# Patient Record
Sex: FEMALE | Race: WHITE | NOT HISPANIC OR LATINO | URBAN - METROPOLITAN AREA
[De-identification: names, ages, dates, MRNs, and addresses within clinical notes are randomized per-mention and may not be internally consistent; named-entity substitution may affect disease eponyms.]

---

## 2023-06-27 ENCOUNTER — EMERGENCY (EMERGENCY)
Facility: HOSPITAL | Age: 18
LOS: 1 days | Discharge: HOME | End: 2023-06-27
Attending: EMERGENCY MEDICINE | Admitting: EMERGENCY MEDICINE
Payer: COMMERCIAL

## 2023-06-27 VITALS
DIASTOLIC BLOOD PRESSURE: 72 MMHG | SYSTOLIC BLOOD PRESSURE: 110 MMHG | RESPIRATION RATE: 18 BRPM | OXYGEN SATURATION: 100 % | TEMPERATURE: 100 F | HEART RATE: 125 BPM

## 2023-06-27 PROCEDURE — 93010 ELECTROCARDIOGRAM REPORT: CPT

## 2023-06-27 PROCEDURE — 99284 EMERGENCY DEPT VISIT MOD MDM: CPT

## 2023-06-27 NOTE — ED PROVIDER NOTE - PATIENT PORTAL LINK FT
You can access the FollowMyHealth Patient Portal offered by [facility] by registering at the following website: [email or web address]. By joining [employer]’s FollowMyHealth portal, you will also be able to view your health information using other applications (apps) compatible with our system.

## 2023-06-27 NOTE — ED ADULT TRIAGE NOTE - CHIEF COMPLAINT QUOTE
Pt c/o fever TMAX 104F at home with generalized bodyaches x1 day. Seen at urgent care today with negative rapid flu/covid. Denies cough, chest pain, palpitations. Took 1G Tylenol around 9PM with relief. Denies hx/daily meds. Pt c/o fever TMAX 104F at home with generalized bodyaches x1 day. Seen at urgent care today with negative rapid flu/covid. Denies cough, chest pain, palpitations. Took 1G Tylenol around 9PM with relief. Denies hx/daily meds. well appearing.

## 2023-06-27 NOTE — ED PROVIDER NOTE - CLINICAL SUMMARY MEDICAL DECISION MAKING FREE TEXT BOX
18F c/o 2-3d of fever, no focal sx but c/o assoc. HA and body aches, gen. weakness, "shakes." +HA worse w high fever PTA, improved at time of exam, doubt meningitis, NT abd to exam, will check UA but no CVAT or N/V. Cont oral liquids, discussed fever ctrl for next 1-2d and FU PMD for persistent fever past 5-7d.

## 2023-06-27 NOTE — ED PROVIDER NOTE - OBJECTIVE STATEMENT
18F c/o 2d hx of fever, today felt worse and noted temp was 104 so came to ED. No CP/SOB, no cough/phlegm, no N/V/D, no urinary c/o, no rashes. Pt. denies sick contacts or travel. In ED began to feel better, poss. due to fever medication taken PTA. Pt. c/o inc. HA w high fever. Denies neck pain, no neck stiffness, no weakness/numbness.

## 2023-06-28 LAB
APPEARANCE UR: CLEAR — SIGNIFICANT CHANGE UP
BACTERIA # UR AUTO: NEGATIVE — SIGNIFICANT CHANGE UP
BILIRUB UR-MCNC: NEGATIVE — SIGNIFICANT CHANGE UP
COLOR SPEC: COLORLESS — SIGNIFICANT CHANGE UP
DIFF PNL FLD: ABNORMAL
EPI CELLS # UR: 2 /HPF — SIGNIFICANT CHANGE UP (ref 0–5)
GLUCOSE UR QL: NEGATIVE — SIGNIFICANT CHANGE UP
HYALINE CASTS # UR AUTO: 0 /LPF — SIGNIFICANT CHANGE UP (ref 0–7)
KETONES UR-MCNC: ABNORMAL
LEUKOCYTE ESTERASE UR-ACNC: NEGATIVE — SIGNIFICANT CHANGE UP
NITRITE UR-MCNC: NEGATIVE — SIGNIFICANT CHANGE UP
PH UR: 6.5 — SIGNIFICANT CHANGE UP (ref 5–8)
PROT UR-MCNC: NEGATIVE — SIGNIFICANT CHANGE UP
RBC CASTS # UR COMP ASSIST: 3 /HPF — SIGNIFICANT CHANGE UP (ref 0–4)
SP GR SPEC: 1.01 — LOW (ref 1.01–1.05)
UROBILINOGEN FLD QL: SIGNIFICANT CHANGE UP
WBC UR QL: 1 /HPF — SIGNIFICANT CHANGE UP (ref 0–5)

## 2023-06-29 ENCOUNTER — INPATIENT (INPATIENT)
Facility: HOSPITAL | Age: 18
LOS: 3 days | Discharge: HOME | End: 2023-07-03
Attending: STUDENT IN AN ORGANIZED HEALTH CARE EDUCATION/TRAINING PROGRAM | Admitting: STUDENT IN AN ORGANIZED HEALTH CARE EDUCATION/TRAINING PROGRAM
Payer: COMMERCIAL

## 2023-06-29 VITALS
SYSTOLIC BLOOD PRESSURE: 105 MMHG | OXYGEN SATURATION: 100 % | TEMPERATURE: 99 F | DIASTOLIC BLOOD PRESSURE: 70 MMHG | RESPIRATION RATE: 98 BRPM | HEART RATE: 115 BPM

## 2023-06-29 LAB
CULTURE RESULTS: SIGNIFICANT CHANGE UP
SPECIMEN SOURCE: SIGNIFICANT CHANGE UP

## 2023-06-29 PROCEDURE — 99285 EMERGENCY DEPT VISIT HI MDM: CPT

## 2023-06-29 RX ORDER — ONDANSETRON 8 MG/1
4 TABLET, FILM COATED ORAL ONCE
Refills: 0 | Status: COMPLETED | OUTPATIENT
Start: 2023-06-29 | End: 2023-06-29

## 2023-06-29 RX ORDER — SODIUM CHLORIDE 9 MG/ML
1000 INJECTION, SOLUTION INTRAVENOUS ONCE
Refills: 0 | Status: COMPLETED | OUTPATIENT
Start: 2023-06-29 | End: 2023-06-29

## 2023-06-29 NOTE — ED PROVIDER NOTE - NS ED ATTENDING STATEMENT MOD
[facility] Intensive [facility]patient [facility]  Interdisciplinary Treatment Plan     Venkata Evangelista  MRN:[id number]   :1998     Diagnosis: Major depressive disorder, single episode, unspecified     Level of Care: IOP     Type of Therapy: Group     Amount: 3 hours per day     Frequency: 3 days per week     The patient is expected to attend 4 total weeks of group therapy unless otherwise indicated by the patient's needs/condition/progress.     The treatment plan will be reviewed in 2 weeks. (10/1/19)     Plan Type: Updated Plan     Your patient, Venkata Evangelista was seen in the Adult Mental Health Intensive Outpatient Program.  Please see below for the patient agreed upon plan, including Goal(s), Focus Indicators, Outcomes and Interventions.       Goals:  Patient Reported Goal #1: \"be happier and get through and enjoy each day.\"     Goal Type:  Pt Reported Goal #1 Type: Long Term Goal     Goal Progression:  Goal #1 Progression: Outcome Met - Continue evaluating goal progress toward completion     Objectives:  Patient will demonstrate reduction of overall anxiety and depressive symptoms, particularly worry and sad/tearful through the use of two or more new coping skills learned as well as verbalize feelings of improved mood/affect by 10/1/19.   Patient will describe alternative ways of dealing with negative feelings and emotional stress by 10/1/19.     Pt has shared of utilizing the different coping skills she's learned so far in programming to assist her with the management of her symptoms, despite reporting mostly fluctuating ratings on the intensity of her symptoms as displayed on her symptom rating scale form. Pt indicated about reaching out to her co-worker for support after she asserted herself when she spoke to her manager at work and it backfired. Pt described of how she focused on \"staying present\" and how helpful that was for her. Pt mentioned of adjustments she made, with emphasis on using  multiple alarms, to help her wake up in the morning which she referenced was a challenge for her.     Pt's ongoing stay in programming would be beneficial for her as she will be learning more coping skills in the upcoming group sessions to further improve her ability to manage symptoms effectively. Pt will work on constructive plans for after discharge.         Interventions:  Distress tolerance (Improve the moment, Pros and cons, TIP, STOP, Progressive muscle relaxation), Mindfulness (What skills, How skills, Mindfulness of current thoughts), Reality acceptance (Radical acceptance)        Jean Claude De Jesus, LPC    Attending with

## 2023-06-29 NOTE — ED ADULT TRIAGE NOTE - CHIEF COMPLAINT QUOTE
c/o n/v for the past 3 days, sent in by PMD  today for low WBC count and platelet count of 68. denies any medical hx.

## 2023-06-29 NOTE — ED PROVIDER NOTE - PHYSICAL EXAMINATION
Const: not in acute distress  Eyes: no conjunctival injection  HEENT: Head NCAT, Moist MM.  Neck: Trachea midline.   CVS: +S1/S2, Peripheral pulses 2+ and equal in all extremities.  RESP: Unlabored respiratory effort. Clear to auscultation bilaterally.  GI: Nontender/Nondistended, No CVA tenderness b/l.   MSK: Normocephalic/Atraumatic, No Lower Extremities edema b/l.   Skin: Intact.   Neuro: Motor & Sensation grossly intact.  Psych: Awake, Alert, & Cooperative

## 2023-06-29 NOTE — ED PROVIDER NOTE - OBJECTIVE STATEMENT
18 yo Female with PMhx underdeveloped GERD and scoliosis, p/w abnormal blood counts, specifically leukopenia and thrombocytopenia, send in by her primary care physician. On Tuesday, patient started to have a headache, fatigue and fever to 101. Patient spiked a fever to 104 later in the day, which prompted her to go to [facility] ED. She had urine collected, which was negative. Patient's fever has come down since then, but patient had some nausea/vomiting today and diarrhea. Nbnb emesis. Patient went to PMD today in preparation for international travel. Patient was noted to have a WBC of 2.1 and platelets of 68. Patient denies increased bleeding, increased bruising, rashes.

## 2023-06-29 NOTE — ED PROVIDER NOTE - CLINICAL SUMMARY MEDICAL DECISION MAKING FREE TEXT BOX
18 yo Female with PMhx underdeveloped GERD and scoliosis, p/w abnormal blood counts, specifically leukopenia and thrombocytopenia, send in by her primary care physician. Vitals tachy to 120s, BP soft at 90s/60s. Physical exam with heart RRR, lungs clear bilaterally, abdomen soft non tender. The differential diagnosis includes but is not limited to leukopenia 2/2 viral illness, ITP (less likely), thyroid dysfunction. H. pylori infection, lab error. Will get labs, coags, lipase, UA/UC, RVP, H. pylori, TSH. 18 yo Female with PMhx underdeveloped GERD and scoliosis, p/w abnormal blood counts, specifically leukopenia and thrombocytopenia, send in by her primary care physician. Vitals tachy to 120s, BP soft at 90s/60s. Physical exam with heart RRR, lungs clear bilaterally, abdomen soft non tender. The differential diagnosis includes but is not limited to leukopenia 2/2 viral illness, HIV, ITP (less likely), thyroid dysfunction. H. pylori infection, lab error. Will get labs, coags, lipase, UA/UC, RVP, H. pylori, TSH. 18 yo Female with PMhx underdeveloped GERD and scoliosis, p/w abnormal blood counts, specifically leukopenia and thrombocytopenia, send in by her primary care physician. Vitals tachy to 120s, BP soft at 90s/60s. Physical exam with heart RRR, lungs clear bilaterally, abdomen soft non tender.  The differential diagnosis includes but is not limited to leukopenia 2/2 viral illness, HIV, ITP (less likely), thyroid dysfunction. H. pylori infection, lab error. Will get labs, coags, lipase, UA/UC, RVP, H. pylori, TSH.

## 2023-06-30 DIAGNOSIS — D75.89 OTHER SPECIFIED DISEASES OF BLOOD AND BLOOD-FORMING ORGANS: ICD-10-CM

## 2023-06-30 DIAGNOSIS — R65.10 SYSTEMIC INFLAMMATORY RESPONSE SYNDROME (SIRS) OF NON-INFECTIOUS ORIGIN WITHOUT ACUTE ORGAN DYSFUNCTION: ICD-10-CM

## 2023-06-30 DIAGNOSIS — Z29.9 ENCOUNTER FOR PROPHYLACTIC MEASURES, UNSPECIFIED: ICD-10-CM

## 2023-06-30 DIAGNOSIS — E87.29 OTHER ACIDOSIS: ICD-10-CM

## 2023-06-30 DIAGNOSIS — R74.01 ELEVATION OF LEVELS OF LIVER TRANSAMINASE LEVELS: ICD-10-CM

## 2023-06-30 DIAGNOSIS — E03.8 OTHER SPECIFIED HYPOTHYROIDISM: ICD-10-CM

## 2023-06-30 DIAGNOSIS — D72.825 BANDEMIA: ICD-10-CM

## 2023-06-30 LAB
ALBUMIN SERPL ELPH-MCNC: 4.2 G/DL — SIGNIFICANT CHANGE UP (ref 3.3–5)
ALP SERPL-CCNC: 81 U/L — SIGNIFICANT CHANGE UP (ref 40–120)
ALT FLD-CCNC: 34 U/L — HIGH (ref 4–33)
ANION GAP SERPL CALC-SCNC: 15 MMOL/L — HIGH (ref 7–14)
APPEARANCE UR: CLEAR — SIGNIFICANT CHANGE UP
APTT BLD: 33.2 SEC — SIGNIFICANT CHANGE UP (ref 27–36.3)
AST SERPL-CCNC: 41 U/L — HIGH (ref 4–32)
B PERT DNA SPEC QL NAA+PROBE: SIGNIFICANT CHANGE UP
B PERT+PARAPERT DNA PNL SPEC NAA+PROBE: SIGNIFICANT CHANGE UP
BACTERIA # UR AUTO: NEGATIVE — SIGNIFICANT CHANGE UP
BASOPHILS # BLD AUTO: 0 K/UL — SIGNIFICANT CHANGE UP (ref 0–0.2)
BASOPHILS NFR BLD AUTO: 0 % — SIGNIFICANT CHANGE UP (ref 0–2)
BILIRUB SERPL-MCNC: 0.8 MG/DL — SIGNIFICANT CHANGE UP (ref 0.2–1.2)
BILIRUB UR-MCNC: NEGATIVE — SIGNIFICANT CHANGE UP
BORDETELLA PARAPERTUSSIS (RAPRVP): SIGNIFICANT CHANGE UP
BUN SERPL-MCNC: 7 MG/DL — SIGNIFICANT CHANGE UP (ref 7–23)
BURR CELLS BLD QL SMEAR: PRESENT — SIGNIFICANT CHANGE UP
BURR CELLS BLD QL SMEAR: SIGNIFICANT CHANGE UP
C PNEUM DNA SPEC QL NAA+PROBE: SIGNIFICANT CHANGE UP
CALCIUM SERPL-MCNC: 8.8 MG/DL — SIGNIFICANT CHANGE UP (ref 8.4–10.5)
CHLORIDE SERPL-SCNC: 104 MMOL/L — SIGNIFICANT CHANGE UP (ref 98–107)
CO2 SERPL-SCNC: 16 MMOL/L — LOW (ref 22–31)
COLOR SPEC: COLORLESS — SIGNIFICANT CHANGE UP
CREAT SERPL-MCNC: 0.56 MG/DL — SIGNIFICANT CHANGE UP (ref 0.5–1.3)
DIFF PNL FLD: ABNORMAL
EGFR: 135 ML/MIN/1.73M2 — SIGNIFICANT CHANGE UP
EOSINOPHIL # BLD AUTO: 0 K/UL — SIGNIFICANT CHANGE UP (ref 0–0.5)
EOSINOPHIL NFR BLD AUTO: 0 % — SIGNIFICANT CHANGE UP (ref 0–6)
EPI CELLS # UR: 1 /HPF — SIGNIFICANT CHANGE UP (ref 0–5)
FLUAV SUBTYP SPEC NAA+PROBE: SIGNIFICANT CHANGE UP
FLUBV RNA SPEC QL NAA+PROBE: SIGNIFICANT CHANGE UP
GIANT PLATELETS BLD QL SMEAR: PRESENT — SIGNIFICANT CHANGE UP
GLUCOSE SERPL-MCNC: 65 MG/DL — LOW (ref 70–99)
GLUCOSE UR QL: NEGATIVE — SIGNIFICANT CHANGE UP
HADV DNA SPEC QL NAA+PROBE: SIGNIFICANT CHANGE UP
HCG SERPL-ACNC: <1 MIU/ML — SIGNIFICANT CHANGE UP
HCOV 229E RNA SPEC QL NAA+PROBE: SIGNIFICANT CHANGE UP
HCOV HKU1 RNA SPEC QL NAA+PROBE: SIGNIFICANT CHANGE UP
HCOV NL63 RNA SPEC QL NAA+PROBE: SIGNIFICANT CHANGE UP
HCOV OC43 RNA SPEC QL NAA+PROBE: SIGNIFICANT CHANGE UP
HCT VFR BLD CALC: 39.4 % — SIGNIFICANT CHANGE UP (ref 34.5–45)
HGB BLD-MCNC: 13.5 G/DL — SIGNIFICANT CHANGE UP (ref 11.5–15.5)
HIV 1+2 AB+HIV1 P24 AG SERPL QL IA: SIGNIFICANT CHANGE UP
HMPV RNA SPEC QL NAA+PROBE: SIGNIFICANT CHANGE UP
HPIV1 RNA SPEC QL NAA+PROBE: SIGNIFICANT CHANGE UP
HPIV2 RNA SPEC QL NAA+PROBE: SIGNIFICANT CHANGE UP
HPIV3 RNA SPEC QL NAA+PROBE: SIGNIFICANT CHANGE UP
HPIV4 RNA SPEC QL NAA+PROBE: SIGNIFICANT CHANGE UP
IANC: 0.98 K/UL — LOW (ref 1.8–7.4)
INR BLD: 1.43 RATIO — HIGH (ref 0.88–1.16)
KETONES UR-MCNC: ABNORMAL
LEUKOCYTE ESTERASE UR-ACNC: NEGATIVE — SIGNIFICANT CHANGE UP
LIDOCAIN IGE QN: 26 U/L — SIGNIFICANT CHANGE UP (ref 7–60)
LYMPHOCYTES # BLD AUTO: 0.43 K/UL — LOW (ref 1–3.3)
LYMPHOCYTES # BLD AUTO: 25.4 % — SIGNIFICANT CHANGE UP (ref 13–44)
M PNEUMO DNA SPEC QL NAA+PROBE: SIGNIFICANT CHANGE UP
MAGNESIUM SERPL-MCNC: 2 MG/DL — SIGNIFICANT CHANGE UP (ref 1.6–2.6)
MANUAL SMEAR VERIFICATION: SIGNIFICANT CHANGE UP
MCHC RBC-ENTMCNC: 29.5 PG — SIGNIFICANT CHANGE UP (ref 27–34)
MCHC RBC-ENTMCNC: 34.3 GM/DL — SIGNIFICANT CHANGE UP (ref 32–36)
MCV RBC AUTO: 86 FL — SIGNIFICANT CHANGE UP (ref 80–100)
MONOCYTES # BLD AUTO: 0.09 K/UL — SIGNIFICANT CHANGE UP (ref 0–0.9)
MONOCYTES NFR BLD AUTO: 5.3 % — SIGNIFICANT CHANGE UP (ref 2–14)
NEUTROPHILS # BLD AUTO: 1.16 K/UL — LOW (ref 1.8–7.4)
NEUTROPHILS NFR BLD AUTO: 53.5 % — SIGNIFICANT CHANGE UP (ref 43–77)
NEUTS BAND # BLD: 14.9 % — CRITICAL HIGH (ref 0–6)
NITRITE UR-MCNC: NEGATIVE — SIGNIFICANT CHANGE UP
PH UR: 6.5 — SIGNIFICANT CHANGE UP (ref 5–8)
PLAT MORPH BLD: NORMAL — SIGNIFICANT CHANGE UP
PLATELET # BLD AUTO: 104 K/UL — LOW (ref 150–400)
PLATELET COUNT - ESTIMATE: ABNORMAL
POIKILOCYTOSIS BLD QL AUTO: SLIGHT — SIGNIFICANT CHANGE UP
POTASSIUM SERPL-MCNC: 4 MMOL/L — SIGNIFICANT CHANGE UP (ref 3.5–5.3)
POTASSIUM SERPL-SCNC: 4 MMOL/L — SIGNIFICANT CHANGE UP (ref 3.5–5.3)
PROT SERPL-MCNC: 7.1 G/DL — SIGNIFICANT CHANGE UP (ref 6–8.3)
PROT UR-MCNC: NEGATIVE — SIGNIFICANT CHANGE UP
PROTHROM AB SERPL-ACNC: 16.7 SEC — HIGH (ref 10.5–13.4)
RAPID RVP RESULT: SIGNIFICANT CHANGE UP
RBC # BLD: 4.58 M/UL — SIGNIFICANT CHANGE UP (ref 3.8–5.2)
RBC # FLD: 13.1 % — SIGNIFICANT CHANGE UP (ref 10.3–14.5)
RBC BLD AUTO: ABNORMAL
RBC CASTS # UR COMP ASSIST: 1 /HPF — SIGNIFICANT CHANGE UP (ref 0–4)
RSV RNA SPEC QL NAA+PROBE: SIGNIFICANT CHANGE UP
RV+EV RNA SPEC QL NAA+PROBE: SIGNIFICANT CHANGE UP
SARS-COV-2 RNA SPEC QL NAA+PROBE: SIGNIFICANT CHANGE UP
SMUDGE CELLS # BLD: PRESENT — SIGNIFICANT CHANGE UP
SODIUM SERPL-SCNC: 135 MMOL/L — SIGNIFICANT CHANGE UP (ref 135–145)
SP GR SPEC: 1.01 — LOW (ref 1.01–1.05)
T3 SERPL-MCNC: 102 NG/DL — SIGNIFICANT CHANGE UP (ref 80–200)
T4 AB SER-ACNC: 6.75 UG/DL — SIGNIFICANT CHANGE UP (ref 5.1–13)
TSH SERPL-MCNC: 15.07 UIU/ML — HIGH (ref 0.27–4.2)
UROBILINOGEN FLD QL: SIGNIFICANT CHANGE UP
VARIANT LYMPHS # BLD: 0.9 % — SIGNIFICANT CHANGE UP (ref 0–6)
WBC # BLD: 1.69 K/UL — LOW (ref 3.8–10.5)
WBC # FLD AUTO: 1.69 K/UL — LOW (ref 3.8–10.5)
WBC UR QL: 2 /HPF — SIGNIFICANT CHANGE UP (ref 0–5)

## 2023-06-30 PROCEDURE — 71046 X-RAY EXAM CHEST 2 VIEWS: CPT | Mod: 26

## 2023-06-30 PROCEDURE — 99223 1ST HOSP IP/OBS HIGH 75: CPT | Mod: GC

## 2023-06-30 RX ORDER — LANOLIN ALCOHOL/MO/W.PET/CERES
3 CREAM (GRAM) TOPICAL AT BEDTIME
Refills: 0 | Status: DISCONTINUED | OUTPATIENT
Start: 2023-06-30 | End: 2023-07-03

## 2023-06-30 RX ORDER — LEVALBUTEROL 1.25 MG/.5ML
0.63 SOLUTION, CONCENTRATE RESPIRATORY (INHALATION) ONCE
Refills: 0 | Status: COMPLETED | OUTPATIENT
Start: 2023-06-30 | End: 2023-06-30

## 2023-06-30 RX ORDER — CEFEPIME 1 G/1
2000 INJECTION, POWDER, FOR SOLUTION INTRAMUSCULAR; INTRAVENOUS ONCE
Refills: 0 | Status: COMPLETED | OUTPATIENT
Start: 2023-06-30 | End: 2023-06-30

## 2023-06-30 RX ORDER — CEFEPIME 1 G/1
INJECTION, POWDER, FOR SOLUTION INTRAMUSCULAR; INTRAVENOUS
Refills: 0 | Status: DISCONTINUED | OUTPATIENT
Start: 2023-06-30 | End: 2023-07-01

## 2023-06-30 RX ORDER — DIPHENHYDRAMINE HCL 50 MG
50 CAPSULE ORAL ONCE
Refills: 0 | Status: COMPLETED | OUTPATIENT
Start: 2023-06-30 | End: 2023-06-30

## 2023-06-30 RX ORDER — DIPHENHYDRAMINE HCL 50 MG
50 CAPSULE ORAL ONCE
Refills: 0 | Status: DISCONTINUED | OUTPATIENT
Start: 2023-06-30 | End: 2023-06-30

## 2023-06-30 RX ORDER — HYDROCORTISONE 20 MG
125 TABLET ORAL ONCE
Refills: 0 | Status: COMPLETED | OUTPATIENT
Start: 2023-06-30 | End: 2023-06-30

## 2023-06-30 RX ORDER — CEFEPIME 1 G/1
2000 INJECTION, POWDER, FOR SOLUTION INTRAMUSCULAR; INTRAVENOUS EVERY 8 HOURS
Refills: 0 | Status: DISCONTINUED | OUTPATIENT
Start: 2023-06-30 | End: 2023-07-01

## 2023-06-30 RX ORDER — ACETAMINOPHEN 500 MG
650 TABLET ORAL EVERY 6 HOURS
Refills: 0 | Status: DISCONTINUED | OUTPATIENT
Start: 2023-06-30 | End: 2023-07-03

## 2023-06-30 RX ORDER — VANCOMYCIN HCL 1 G
1000 VIAL (EA) INTRAVENOUS ONCE
Refills: 0 | Status: COMPLETED | OUTPATIENT
Start: 2023-06-30 | End: 2023-06-30

## 2023-06-30 RX ADMIN — Medication 50 MILLIGRAM(S): at 05:55

## 2023-06-30 RX ADMIN — CEFEPIME 100 MILLIGRAM(S): 1 INJECTION, POWDER, FOR SOLUTION INTRAMUSCULAR; INTRAVENOUS at 22:26

## 2023-06-30 RX ADMIN — Medication 650 MILLIGRAM(S): at 19:08

## 2023-06-30 RX ADMIN — Medication 250 MILLIGRAM(S): at 04:59

## 2023-06-30 RX ADMIN — ONDANSETRON 4 MILLIGRAM(S): 8 TABLET, FILM COATED ORAL at 00:01

## 2023-06-30 RX ADMIN — CEFEPIME 100 MILLIGRAM(S): 1 INJECTION, POWDER, FOR SOLUTION INTRAMUSCULAR; INTRAVENOUS at 04:28

## 2023-06-30 RX ADMIN — Medication 650 MILLIGRAM(S): at 20:49

## 2023-06-30 RX ADMIN — LEVALBUTEROL 0.63 MILLIGRAM(S): 1.25 SOLUTION, CONCENTRATE RESPIRATORY (INHALATION) at 06:28

## 2023-06-30 RX ADMIN — CEFEPIME 100 MILLIGRAM(S): 1 INJECTION, POWDER, FOR SOLUTION INTRAMUSCULAR; INTRAVENOUS at 15:35

## 2023-06-30 RX ADMIN — Medication 125 MILLIGRAM(S): at 05:53

## 2023-06-30 RX ADMIN — SODIUM CHLORIDE 1000 MILLILITER(S): 9 INJECTION, SOLUTION INTRAVENOUS at 00:01

## 2023-06-30 NOTE — H&P ADULT - PROBLEM SELECTOR PLAN 1
Patient p/w bicytopenia w/ mild-mod neutropenia (ANC=0.98) w/ reported fever at home but afebrile on presentation. [id number]  - Etiology likely iso viral marrow suppression: HIV neg, f/u Hepatitis and EBV. Unclear if patient   - Pt reportedly w/ fever at home but afebrile on presentation. Neutropenia w/ ANC 0.98 mild/moderate s/p cefepime x1. Given mild-mod neutropenia can monitor off abx. Low threshold to start cefepime  - Infectious w/u: f/u BCx; UCx  - ; not at risk of spontaneous bleed. c/t monitor  - Trend ANC, PLT, fever curve Patient p/w bicytopenia w/ mild-mod neutropenia (ANC=0.98) w/ reported fever at home but afebrile on presentation. ICB=580  - Etiology likely iso viral marrow suppression: HIV neg, f/u Hepatitis and EBV. Unclear if patient   - Pt reportedly w/ fever at home but afebrile on presentation. Neutropenia w/ ANC 0.98 mild/moderate s/p cefepime x1.   - consider cefepime  - Infectious w/u: f/u BCx; UCx  - ; not at risk of spontaneous bleed. c/t monitor  - Trend ANC, PLT, fever curve Patient p/w bicytopenia w/ mild-mod neutropenia (ANC=0.98) w/ reported fever at home but afebrile on presentation. [id number]  - Etiology likely iso viral marrow suppression: HIV neg, f/u Hepatitis and EBV.   - No splenomegaly or LUQ pain on exam  - Pt reportedly w/ fever at home but afebrile on presentation. Neutropenia w/ ANC 0.98 mild/moderate s/p cefepime x1.   - will start emperic cefepime coverage 2g q8h for now  - Infectious w/u: f/u BCx; UCx  - ; not at risk of spontaneous bleed. c/t monitor  - Trend ANC, PLT, fever curve

## 2023-06-30 NOTE — PATIENT PROFILE ADULT - FALL HARM RISK - HARM RISK INTERVENTIONS

## 2023-06-30 NOTE — ED ADULT NURSE REASSESSMENT NOTE - NS ED NURSE REASSESS COMMENT FT1
Vancomycin discontinued 20 mins after start of administration.  Vancomycin was administered over IV pump  Pt's grandmother informed this RN regarding Pt feeling discomfort.  Pt appeared visibly flushed, but denied difficulty breathing or swallowing  Current vital signs:  /69  Hr 110  O2 Sat 100% room air  Pt not in any visible distress at time of reassessment, breathing is even and unlabored   Admitting Provider notified regarding reaction. Currently at the bedside with Pt. Pending Provider orders

## 2023-06-30 NOTE — H&P ADULT - NSHPSOCIALHISTORY_GEN_ALL_CORE
Pt lives at home with mother. She is going to [employer] in [place]. She denies alcohol, drug, tobacco use.

## 2023-06-30 NOTE — H&P ADULT - NSHPREVIEWOFSYSTEMS_GEN_ALL_CORE
REVIEW OF SYSTEMS:    CONSTITUTIONAL: No weakness, fevers or chills  EYES/ENT: No visual changes;  No vertigo or throat pain   NECK: No pain or stiffness  RESPIRATORY: No cough, wheezing, hemoptysis; No shortness of breath  CARDIOVASCULAR: No chest pain or palpitations  GASTROINTESTINAL: No abdominal or epigastric pain. No nausea, vomiting, or hematemesis; No diarrhea or constipation. No melena or hematochezia.  GENITOURINARY: No dysuria, frequency or hematuria  NEUROLOGICAL: No numbness or weakness  SKIN: No itching, rashes REVIEW OF SYSTEMS:    CONSTITUTIONAL: +weakness, +fevers or chills  EYES/ENT: No visual changes;  No vertigo or throat pain   NECK: No pain or stiffness  RESPIRATORY: No cough, wheezing, hemoptysis; No shortness of breath  CARDIOVASCULAR: No chest pain or palpitations  GASTROINTESTINAL: No abdominal or epigastric pain. +nausea/vomiting. No hematemesis; +Loose BM. No diarrhea or constipation. No melena or hematochezia.  GENITOURINARY: No dysuria, frequency or hematuria  NEUROLOGICAL: No numbness or weakness  SKIN: No itching, rashes

## 2023-06-30 NOTE — H&P ADULT - ASSESSMENT
17F w/ PMH GERD, developmental disorder, scoliosis p/w bicytopenia (YBO=304; ANC=0.98) likely 2/2 viral marrow suppression.

## 2023-06-30 NOTE — H&P ADULT - NSHPPHYSICALEXAM_GEN_ALL_CORE
Vital Signs Last 24 Hrs  T(C): 37 (30 Jun 2023 05:29), Max: 37 (29 Jun 2023 19:30)  T(F): 98.6 (30 Jun 2023 05:29), Max: 98.6 (29 Jun 2023 19:30)  HR: 93 (30 Jun 2023 05:29) (93 - 124)  BP: 97/69 (30 Jun 2023 05:29) (96/67 - 105/70)  BP(mean): 76 (30 Jun 2023 05:29) (76 - 77)  RR: 16 (30 Jun 2023 05:29) (16 - 98)  SpO2: 100% (30 Jun 2023 05:29) (100% - 100%)    Parameters below as of 30 Jun 2023 05:29  Patient On (Oxygen Delivery Method): room air      PHYSICAL EXAM:  GENERAL: NAD, well-groomed, well-developed  HEAD:  Atraumatic, Normocephalic  EYES: EOMI, PERRLA, conjunctiva and sclera clear  ENMT: No tonsillar erythema, exudates, or enlargement; Moist mucous membranes  NECK: Supple, No JVD, Normal thyroid  HEART: Regular rate and rhythm; No murmurs, rubs, or gallops  RESPIRATORY: CTA B/L, No W/R/R  ABDOMEN: Soft, Nontender, Nondistended; Bowel sounds present  NEUROLOGY: A&Ox3, nonfocal, moving all extremities  EXTREMITIES:  2+ Peripheral Pulses, No clubbing, cyanosis, or edema  SKIN: warm, dry, normal color, no rash or abnormal lesions

## 2023-06-30 NOTE — H&P ADULT - PROBLEM SELECTOR PLAN 6
DVT ppx: IMPROVE=0. Pt is ambulatory. No pharmacologic ppx  Diet: Regular  Dispo: Pending clinical course to home

## 2023-06-30 NOTE — H&P ADULT - PROBLEM SELECTOR PLAN 5
DVT ppx: IMPROVE=0. Pt is ambulatory. No pharmacologic ppx  Diet: Regular  Dispo: Pending clinical course to home AGMA possibly iso ketosis d/t nausea/vomiting  - C/t monitor

## 2023-06-30 NOTE — CHART NOTE - NSCHARTNOTEFT_GEN_A_CORE
notified by RN patient reporting itching and tachycardic shortly after starting vancomycin. Patient assessed at bedside, NAD, reporting generalized itching, chest pressure, head pressure. Denies throat tightness difficulty breathing difficulty swallowing     Vital Signs Last 24 Hrs  T(C): 37 (30 Jun 2023 05:29), Max: 37 (29 Jun 2023 19:30)  T(F): 98.6 (30 Jun 2023 05:29), Max: 98.6 (29 Jun 2023 19:30)  HR: 93 (30 Jun 2023 05:29) (93 - 124)  BP: 97/69 (30 Jun 2023 05:29) (96/67 - 105/70)  BP(mean): 76 (30 Jun 2023 05:29) (76 - 77)  RR: 16 (30 Jun 2023 05:29) (16 - 98)  SpO2: 100% (30 Jun 2023 05:29) (100% - 100%)    Parameters below as of 30 Jun 2023 05:29  Patient On (Oxygen Delivery Method): room air    Physical Exam:  Gen: NAD, AOx4  Lungs: wheezing on ascultation. No rales or rhonchi  Skin: diffuse blotchy rash,   ENT: No tongue swelling erythema pharynx pink airway patent no stridor appreciated on exam.     -Pt given PO benedryl 50mg x1, IV solucortef 125mg, and xopenex   -CTM

## 2023-06-30 NOTE — H&P ADULT - ATTENDING COMMENTS
16 yo Female with PMHx unspecified developmental disorder, scoliosis, p/w low neutrophil count and bandemia. and thrombocytopenia. Patient w/ recent illness w/ nausea and vomiting possible viral gastroenteritis. Patient meeting SIRS criteria, so started on cefepime given neutropenia. Infectious workup so far un reveal CXR negative, RVP negative, Urine culture negative, HIV negative. Also w/ noted mild transaminitis. Ordered hepatitis panel and EBV for further workup. B12 and folate additionally ordered to assess for nutrient deficiency contributing to abnormal cbc. Could be post viral bone marrow suppression however leukemia within differential given bandemia. Will consider heme consult pending this workup and if counts do not improve.

## 2023-06-30 NOTE — H&P ADULT - TIME BILLING
Time spent on review of vital signs, labs, prior documentation. Patient interviewed and examined during this encounter. Plan of care was discussed with patient, patient mom and resident team. Encounter documented.

## 2023-06-30 NOTE — ED ADULT NURSE NOTE - OBJECTIVE STATEMENT
Pt received from triage on stretcher A/Ox4 answer all questions appropriately   C/C abnormal lab results from PCP, referred to our facility  Denies N-V-D, endorsing generalized weakness.  Pt denies chest pain and SOB during initial assessment, breathing is even and unlabored on room air  Abdomin is soft and non distended and non tender to touch  Pt ambulates independently with steady gait, moves all four extremities on command, skin is intact  Pt not in any apparent distress at time of initial assessment, resting comfortably at the bedside  Vital signs stable, Allergies noted to antibiotics    Denies PMHx  Pending provider orders Pt received from triage on stretcher A/Ox4 answer all questions appropriately   C/C abnormal lab results from PCP, referred to our facility  Denies N-V-D, endorsing generalized weakness.  Pt denies chest pain and SOB during initial assessment, breathing is even and unlabored on room air  Abdomin is soft and non distended and non tender to touch  Pt ambulates independently with steady gait, moves all four extremities on command, skin is intact  Pt not in any apparent distress at time of initial assessment, resting comfortably at the bedside  Vital signs stable, Allergies to Amoxicillin   Denies PMHx  Pending provider orders

## 2023-06-30 NOTE — ED ADULT NURSE REASSESSMENT NOTE - NS ED NURSE REASSESS COMMENT FT1
Break coverage RN: Pt A&Ox4 resting on stretcher. Respirations even and unlabored. Pt offers no complaints at this time. no acute distress noted. Pending lab results. Bed in lowest, safety maintained. Call bell within reach.

## 2023-06-30 NOTE — H&P ADULT - PROBLEM SELECTOR PLAN 2
Pt w/ elevated TSH w/ normal thyroxine and T3  - Will need to recheck TFT's in 4-6 weeks outpatient w/ PCP Pt meets SIRS criteria on presentation w/ tachycardia and leukopenia. Pt afebrile normotensive  - Etiology of infection likely viral  - Given neutropenia can consider cefepime  - F/u Infectious w/u: BCx, UCx Pt meets SIRS criteria on presentation w/ tachycardia and leukopenia. Pt afebrile normotensive  - Etiology of infection likely viral  - Given neutropenia will start cefepime  - F/u Infectious w/u: BCx, UCx

## 2023-06-30 NOTE — H&P ADULT - PROBLEM SELECTOR PLAN 3
Pt w/ mild transaminitis likely iso viral infection  - C/t monitor LFTs Pt w/ elevated TSH w/ normal thyroxine and T3  - Will need to recheck TFT's in 4-6 weeks outpatient w/ PCP

## 2023-06-30 NOTE — H&P ADULT - PROBLEM SELECTOR PLAN 4
AGMA possibly iso ketosis d/t nausea/vomiting  - C/t monitor Pt w/ mild transaminitis likely iso viral infection  - C/t monitor LFTs

## 2023-06-30 NOTE — H&P ADULT - HISTORY OF PRESENT ILLNESS
17F w/ PMhx developmental disorder, GERD, and scoliosis, p/w leukopenia and thrombocytopenia, send in by her primary care physician d/t abnormal outpatient labs. On Tuesday, patient started to have a headache, fatigue and fever reportedly up to 104F, which prompted her to go to [facility] ED. Pt's symptoms were a/w NBNB vomiting and diarrhea. Patient denies increased bleeding, increased bruising, rashes.    In the ED patient presented afebrile, tachycardic 's/60s, on Room air. Patient was found to have bicytopenia w/ ANC<1; . Started on cefepime and vancomycin. Pt w/ generalized rash and urticaria s/p vancomycin w/o s/s anaphylaxis.    17F w/ PMhx developmental disorder, GERD, and scoliosis, p/w leukopenia and thrombocytopenia, send in by her primary care physician d/t abnormal outpatient labs. On Tuesday, patient started to have a headache, fatigue and fever reportedly up to 104F, which prompted her to go to [facility] ED. Pt's symptoms were a/w NBNB vomiting and lose bowel movements. Patient reports that she was recently on a trip to [place]. She denies any sick contacts. Denies any recent viral prodrome to her symptoms. Per the patients mother, symptoms started rapidly and progressed over several days. Per parents patient has not had any hematologic disorders diagnosed by outpatient PCP. Pt has no family history of blood dyscrasias or malignancies. Patient denies increased bleeding, increased bruising, rashes.    In the ED patient presented afebrile, tachycardic 's/60s, on Room air. Patient was found to have bicytopenia w/ ANC<1; . Started on cefepime and vancomycin. Pt w/ generalized rash and urticaria s/p vancomycin w/o s/s anaphylaxis.

## 2023-06-30 NOTE — PATIENT PROFILE ADULT - FUNCTIONAL ASSESSMENT - DAILY ACTIVITY 5.
-Suspect symptoms 2/2 thoracic/lumbar paraspinal tenderness  -Provided prescription for naproxen 500mg BID x 10 days w/ meals  -Recommended frequent breaks at work and walking around  -Recommended chair cushion and heating pad for support  -Discussed back exercises, handout provided.   -CTM   4 = No assist / stand by assistance

## 2023-07-01 LAB
ALBUMIN SERPL ELPH-MCNC: 3.8 G/DL — SIGNIFICANT CHANGE UP (ref 3.3–5)
ALP SERPL-CCNC: 101 U/L — SIGNIFICANT CHANGE UP (ref 40–120)
ALT FLD-CCNC: 114 U/L — HIGH (ref 4–33)
ANION GAP SERPL CALC-SCNC: 13 MMOL/L — SIGNIFICANT CHANGE UP (ref 7–14)
AST SERPL-CCNC: 134 U/L — HIGH (ref 4–32)
BASOPHILS # BLD AUTO: 0.01 K/UL — SIGNIFICANT CHANGE UP (ref 0–0.2)
BASOPHILS NFR BLD AUTO: 0.3 % — SIGNIFICANT CHANGE UP (ref 0–2)
BILIRUB SERPL-MCNC: 0.6 MG/DL — SIGNIFICANT CHANGE UP (ref 0.2–1.2)
BUN SERPL-MCNC: 8 MG/DL — SIGNIFICANT CHANGE UP (ref 7–23)
CALCIUM SERPL-MCNC: 9 MG/DL — SIGNIFICANT CHANGE UP (ref 8.4–10.5)
CHLORIDE SERPL-SCNC: 103 MMOL/L — SIGNIFICANT CHANGE UP (ref 98–107)
CO2 SERPL-SCNC: 20 MMOL/L — LOW (ref 22–31)
CREAT SERPL-MCNC: 0.61 MG/DL — SIGNIFICANT CHANGE UP (ref 0.5–1.3)
CULTURE RESULTS: SIGNIFICANT CHANGE UP
EBV EA AB SER IA-ACNC: <5 U/ML — SIGNIFICANT CHANGE UP
EBV EA AB TITR SER IF: NEGATIVE — SIGNIFICANT CHANGE UP
EBV EA IGG SER-ACNC: NEGATIVE — SIGNIFICANT CHANGE UP
EBV NA IGG SER IA-ACNC: <3 U/ML — SIGNIFICANT CHANGE UP
EBV PATRN SPEC IB-IMP: SIGNIFICANT CHANGE UP
EBV VCA IGG AVIDITY SER QL IA: NEGATIVE — SIGNIFICANT CHANGE UP
EBV VCA IGM SER IA-ACNC: <10 U/ML — SIGNIFICANT CHANGE UP
EBV VCA IGM SER IA-ACNC: <10 U/ML — SIGNIFICANT CHANGE UP
EBV VCA IGM TITR FLD: NEGATIVE — SIGNIFICANT CHANGE UP
EGFR: 131 ML/MIN/1.73M2 — SIGNIFICANT CHANGE UP
EOSINOPHIL # BLD AUTO: 0 K/UL — SIGNIFICANT CHANGE UP (ref 0–0.5)
EOSINOPHIL NFR BLD AUTO: 0 % — SIGNIFICANT CHANGE UP (ref 0–6)
FOLATE SERPL-MCNC: 20 NG/ML — HIGH (ref 3.1–17.5)
GLUCOSE SERPL-MCNC: 87 MG/DL — SIGNIFICANT CHANGE UP (ref 70–99)
H PYLORI AB SER-ACNC: <5 UNITS — SIGNIFICANT CHANGE UP
HAV IGM SER-ACNC: SIGNIFICANT CHANGE UP
HBV CORE IGM SER-ACNC: SIGNIFICANT CHANGE UP
HBV SURFACE AG SER-ACNC: SIGNIFICANT CHANGE UP
HCT VFR BLD CALC: 35.7 % — SIGNIFICANT CHANGE UP (ref 34.5–45)
HCV AB S/CO SERPL IA: 0.11 S/CO — SIGNIFICANT CHANGE UP (ref 0–0.99)
HCV AB SERPL-IMP: SIGNIFICANT CHANGE UP
HGB BLD-MCNC: 12 G/DL — SIGNIFICANT CHANGE UP (ref 11.5–15.5)
IANC: 1.43 K/UL — LOW (ref 1.8–7.4)
IMM GRANULOCYTES NFR BLD AUTO: 0.3 % — SIGNIFICANT CHANGE UP (ref 0–0.9)
LYMPHOCYTES # BLD AUTO: 1.4 K/UL — SIGNIFICANT CHANGE UP (ref 1–3.3)
LYMPHOCYTES # BLD AUTO: 47.3 % — HIGH (ref 13–44)
MAGNESIUM SERPL-MCNC: 1.8 MG/DL — SIGNIFICANT CHANGE UP (ref 1.6–2.6)
MCHC RBC-ENTMCNC: 29.3 PG — SIGNIFICANT CHANGE UP (ref 27–34)
MCHC RBC-ENTMCNC: 33.6 GM/DL — SIGNIFICANT CHANGE UP (ref 32–36)
MCV RBC AUTO: 87.3 FL — SIGNIFICANT CHANGE UP (ref 80–100)
MONOCYTES # BLD AUTO: 0.11 K/UL — SIGNIFICANT CHANGE UP (ref 0–0.9)
MONOCYTES NFR BLD AUTO: 3.7 % — SIGNIFICANT CHANGE UP (ref 2–14)
NEUTROPHILS # BLD AUTO: 1.43 K/UL — LOW (ref 1.8–7.4)
NEUTROPHILS NFR BLD AUTO: 48.4 % — SIGNIFICANT CHANGE UP (ref 43–77)
NRBC # BLD: 0 /100 WBCS — SIGNIFICANT CHANGE UP (ref 0–0)
NRBC # FLD: 0 K/UL — SIGNIFICANT CHANGE UP (ref 0–0)
PHOSPHATE SERPL-MCNC: 2.6 MG/DL — SIGNIFICANT CHANGE UP (ref 2.5–4.5)
PLATELET # BLD AUTO: 122 K/UL — LOW (ref 150–400)
POTASSIUM SERPL-MCNC: 3.5 MMOL/L — SIGNIFICANT CHANGE UP (ref 3.5–5.3)
POTASSIUM SERPL-SCNC: 3.5 MMOL/L — SIGNIFICANT CHANGE UP (ref 3.5–5.3)
PROT SERPL-MCNC: 6.4 G/DL — SIGNIFICANT CHANGE UP (ref 6–8.3)
RBC # BLD: 4.09 M/UL — SIGNIFICANT CHANGE UP (ref 3.8–5.2)
RBC # FLD: 13.4 % — SIGNIFICANT CHANGE UP (ref 10.3–14.5)
SODIUM SERPL-SCNC: 136 MMOL/L — SIGNIFICANT CHANGE UP (ref 135–145)
SPECIMEN SOURCE: SIGNIFICANT CHANGE UP
VIT B12 SERPL-MCNC: 1159 PG/ML — HIGH (ref 200–900)
WBC # BLD: 2.96 K/UL — LOW (ref 3.8–10.5)
WBC # FLD AUTO: 2.96 K/UL — LOW (ref 3.8–10.5)

## 2023-07-01 PROCEDURE — 99233 SBSQ HOSP IP/OBS HIGH 50: CPT | Mod: GC

## 2023-07-01 RX ORDER — CEFEPIME 1 G/1
1000 INJECTION, POWDER, FOR SOLUTION INTRAMUSCULAR; INTRAVENOUS EVERY 8 HOURS
Refills: 0 | Status: DISCONTINUED | OUTPATIENT
Start: 2023-07-01 | End: 2023-07-02

## 2023-07-01 RX ORDER — MAGNESIUM SULFATE 500 MG/ML
1 VIAL (ML) INJECTION ONCE
Refills: 0 | Status: COMPLETED | OUTPATIENT
Start: 2023-07-01 | End: 2023-07-01

## 2023-07-01 RX ORDER — POTASSIUM CHLORIDE 20 MEQ
40 PACKET (EA) ORAL ONCE
Refills: 0 | Status: COMPLETED | OUTPATIENT
Start: 2023-07-01 | End: 2023-07-01

## 2023-07-01 RX ADMIN — CEFEPIME 100 MILLIGRAM(S): 1 INJECTION, POWDER, FOR SOLUTION INTRAMUSCULAR; INTRAVENOUS at 22:22

## 2023-07-01 RX ADMIN — CEFEPIME 100 MILLIGRAM(S): 1 INJECTION, POWDER, FOR SOLUTION INTRAMUSCULAR; INTRAVENOUS at 06:12

## 2023-07-01 RX ADMIN — Medication 40 MILLIEQUIVALENT(S): at 09:45

## 2023-07-01 RX ADMIN — CEFEPIME 100 MILLIGRAM(S): 1 INJECTION, POWDER, FOR SOLUTION INTRAMUSCULAR; INTRAVENOUS at 14:01

## 2023-07-01 RX ADMIN — Medication 100 GRAM(S): at 09:45

## 2023-07-01 NOTE — PROGRESS NOTE ADULT - ASSESSMENT
17F w/ PMH GERD, developmental disorder, scoliosis p/w bicytopenia (GPC=421; ANC=0.98) likely 2/2 viral marrow suppression.

## 2023-07-01 NOTE — PROGRESS NOTE ADULT - SUBJECTIVE AND OBJECTIVE BOX
Vishnu White, PGY1    SEBASTIANKEITHDAVID CHETNA  18y  Female    Complaints:  Subjective:     No acute o/n events. Pt denies subj fevers/chills, HA, dizziness, chest pain, palpitations, n/v, abd pain, dysuria, diarrhea      FAMILY HISTORY:    18yVital Signs Last 24 Hrs  T(C): 36.3 (01 Jul 2023 06:36), Max: 38 (30 Jun 2023 19:05)  T(F): 97.3 (01 Jul 2023 06:36), Max: 100.4 (30 Jun 2023 19:05)  HR: 99 (01 Jul 2023 06:36) (80 - 103)  BP: 96/67 (01 Jul 2023 06:36) (96/67 - 108/69)  BP(mean): --  RR: 18 (01 Jul 2023 06:36) (18 - 18)  SpO2: 100% (01 Jul 2023 06:36) (99% - 100%)    Parameters below as of 01 Jul 2023 06:36  Patient On (Oxygen Delivery Method): room air          PHYSICAL EXAM:  GENERAL: NAD, well-groomed, well-developed  HEAD:  Atraumatic, Normocephalic  EYES: EOMI, PERRLA, conjunctiva and sclera clear  ENMT: No tonsillar erythema, exudates, or enlargement; Moist mucous membranes, Good dentition, No lesions  NECK: Supple, No JVD, Normal thyroid  NERVOUS SYSTEM:  Alert & Oriented X3, Good concentration; Motor Strength 5/5 B/L upper and lower extremities; DTRs 2+ intact and symmetric  CHEST/LUNG: Clear to percussion bilaterally; No rales, rhonchi, wheezing, or rubs  HEART: Regular rate and rhythm; No murmurs, rubs, or gallops  ABDOMEN: Soft, Nontender, Nondistended; Bowel sounds present  EXTREMITIES:  2+ Peripheral Pulses, No clubbing, cyanosis, or edema  LYMPH: No lymphadenopathy noted  SKIN: No rashes or lesions      Consultant(s) Notes Reviewed:  [x ] YES  [ ] NO  Care Discussed with Consultants/Other Providers [ x] YES  [ ] NO    LABS:                Female  RADIOLOGY & ADDITIONAL TESTS:    Imaging Personally Reviewed:  [ ] YES  [ ] NO    MedsMEDICATIONS  (STANDING):  cefepime   IVPB      cefepime   IVPB 2000 milliGRAM(s) IV Intermittent every 8 hours    MEDICATIONS  (PRN):  acetaminophen     Tablet .. 650 milliGRAM(s) Oral every 6 hours PRN Temp greater or equal to 38C (100.4F), Mild Pain (1 - 3)  melatonin 3 milliGRAM(s) Oral at bedtime PRN Insomnia      HEALTH ISSUES - PROBLEM Dx:  Bicytopenia    SIRS (systemic inflammatory response syndrome)    Subclinical hypothyroidism    Transaminitis    Increased anion gap metabolic acidosis    Prophylactic measure                   Vishnu White, PGY1    SEBASTIANSABASGINA CHETNA  18y  Female    Complaints:  Subjective:     No acute o/n events. Pt denies subj fevers/chills, HA, dizziness, chest pain, palpitations, n/v, abd pain, dysuria, diarrhea      FAMILY HISTORY:    18yVital Signs Last 24 Hrs  T(C): 36.3 (01 Jul 2023 06:36), Max: 38 (30 Jun 2023 19:05)  T(F): 97.3 (01 Jul 2023 06:36), Max: 100.4 (30 Jun 2023 19:05)  HR: 99 (01 Jul 2023 06:36) (80 - 103)  BP: 96/67 (01 Jul 2023 06:36) (96/67 - 108/69)  BP(mean): --  RR: 18 (01 Jul 2023 06:36) (18 - 18)  SpO2: 100% (01 Jul 2023 06:36) (99% - 100%)    Parameters below as of 01 Jul 2023 06:36  Patient On (Oxygen Delivery Method): room air          PHYSICAL EXAM:  GENERAL: NAD, well-groomed, well-developed  HEAD:  Atraumatic, Normocephalic  EYES: EOMI, PERRLA, conjunctiva and sclera clear  ENMT: No tonsillar erythema, exudates, or enlargement; Moist mucous membranes, Good dentition, No lesions  NECK: Supple, No JVD, Normal thyroid  NERVOUS SYSTEM:  Alert & Oriented X3, Good concentration; Motor Strength 5/5 B/L upper and lower extremities; DTRs 2+ intact and symmetric  CHEST/LUNG: Clear to percussion bilaterally; No rales, rhonchi, wheezing, or rubs  HEART: Regular rate and rhythm; No murmurs, rubs, or gallops  ABDOMEN: Soft, Nontender, Nondistended; Bowel sounds present  EXTREMITIES:  2+ Peripheral Pulses, No clubbing, cyanosis, or edema  LYMPH: No lymphadenopathy noted  SKIN: No rashes or lesions      Consultant(s) Notes Reviewed:  [x ] YES  [ ] NO  Care Discussed with Consultants/Other Providers [ x] YES  [ ] NO    LABS:                          12.0   2.96  )-----------( 122      ( 01 Jul 2023 06:00 )             35.7       07-01    136  |  103  |  8   ----------------------------<  87  3.5   |  20<L>  |  0.61    Ca    9.0      01 Jul 2023 06:00  Phos  2.6     07-01  Mg     1.80     07-01    TPro  6.4  /  Alb  3.8  /  TBili  0.6  /  DBili  x   /  AST  134<H>  /  ALT  114<H>  /  AlkPhos  101  07-01              Urinalysis Basic - ( 01 Jul 2023 06:00 )    Color: x / Appearance: x / SG: x / pH: x  Gluc: 87 mg/dL / Ketone: x  / Bili: x / Urobili: x   Blood: x / Protein: x / Nitrite: x   Leuk Esterase: x / RBC: x / WBC x   Sq Epi: x / Non Sq Epi: x / Bacteria: x        PT/INR - ( 29 Jun 2023 23:40 )   PT: 16.7 sec;   INR: 1.43 ratio         PTT - ( 29 Jun 2023 23:40 )  PTT:33.2 sec          CAPILLARY BLOOD GLUCOSE        Female  RADIOLOGY & ADDITIONAL TESTS:    Imaging Personally Reviewed:  [ ] YES  [ ] NO    MedsMEDICATIONS  (STANDING):  cefepime   IVPB      cefepime   IVPB 2000 milliGRAM(s) IV Intermittent every 8 hours    MEDICATIONS  (PRN):  acetaminophen     Tablet .. 650 milliGRAM(s) Oral every 6 hours PRN Temp greater or equal to 38C (100.4F), Mild Pain (1 - 3)  melatonin 3 milliGRAM(s) Oral at bedtime PRN Insomnia      HEALTH ISSUES - PROBLEM Dx:  Bicytopenia    SIRS (systemic inflammatory response syndrome)    Subclinical hypothyroidism    Transaminitis    Increased anion gap metabolic acidosis    Prophylactic measure

## 2023-07-01 NOTE — DISCHARGE NOTE PROVIDER - NSDCCPCAREPLAN_GEN_ALL_CORE_FT
PRINCIPAL DISCHARGE DIAGNOSIS  Diagnosis: Bandemia  Assessment and Plan of Treatment: You came to the hospital with low white blood cell counts. Specifically, 2 types of white blood cells called the neutrophils and platelets were low. Neutrophils help protect your body against infection. Platelets help prevent bleeding. You came to the hospital w/ some GI symptoms including nausea/vomiting. We beleive you had a viral gastroenteritis. The viral illnes likely caused a temporary suppression of your bone marrow, the part of the body that produces blood cells. As your body recovered and cleared the infection your cell counts slowly improved. You were treated prophylactically with IV antibiotics. You did not have any confirmed bacterial infection, but with low levels of neutrophils, you are at risk of developing an infection. As your white blood cells and neutrophils increased in number, the antibiotics were discontinued as you no longer needed them. It is important that you follow up with your primary care doctor within 1 week to have repeat blood work to confirm continued improvement in your blood cell counts.      SECONDARY DISCHARGE DIAGNOSES  Diagnosis: Transaminitis  Assessment and Plan of Treatment: You had mild elevation in your liver enzymes. This is likely in reponse to the viral gastroenteritis and is a common and temporary reaction. You liver enzymes stabilized and you did not have any cocerning abdominal pain. You should follow up with you primary care doctor in 1 week to repeat blood work to ensure your labs are returning to your baseline.     PRINCIPAL DISCHARGE DIAGNOSIS  Diagnosis: Bandemia  Assessment and Plan of Treatment: You came to the hospital with low white blood cell counts. Specifically, 2 types of white blood cells called the neutrophils and platelets were low. Neutrophils help protect your body against infection. Platelets help prevent bleeding. You came to the hospital w/ some GI symptoms including nausea/vomiting. We beleive you had a viral gastroenteritis. The viral illnes likely caused a temporary suppression of your bone marrow, the part of the body that produces blood cells. As your body recovered and cleared the infection your cell counts slowly improved. It is unclear which viral illness you had. You were tested for several including EBV (Mono), HIV, Hepatitis, which were all negative. You were treated prophylactically with IV antibiotics. You did not have any confirmed bacterial infection, but with low levels of neutrophils, you are at risk of developing an infection. As your white blood cells and neutrophils increased in number, the antibiotics were discontinued as you no longer needed them. It is important that you follow up with your primary care doctor within 1 week to have repeat blood work to confirm continued improvement in your blood cell counts.      SECONDARY DISCHARGE DIAGNOSES  Diagnosis: Transaminitis  Assessment and Plan of Treatment: You had mild elevation in your liver enzymes. This is likely in reponse to the viral gastroenteritis and is a common and temporary reaction. You liver enzymes stabilized and you did not have any cocerning abdominal pain. You should follow up with you primary care doctor in 1 week to repeat blood work to ensure your labs are returning to your baseline.     PRINCIPAL DISCHARGE DIAGNOSIS  Diagnosis: Bandemia  Assessment and Plan of Treatment: You came to the hospital with low white blood cell counts. Specifically, 2 types of white blood cells called the neutrophils and platelets were low. Neutrophils help protect your body against infection. Platelets help prevent bleeding. You came to the hospital w/ some GI symptoms including nausea/vomiting. We beleive you had a viral gastroenteritis. The viral illnes likely caused a temporary suppression of your bone marrow, the part of the body that produces blood cells. As your body recovered and cleared the infection, your cell counts slowly improved. It is unclear which viral illness you had. You were tested for several including EBV (Mono), HIV, Hepatitis, which were all negative. You were treated prophylactically with IV antibiotics. You did not have any confirmed bacterial infection, but with low levels of neutrophils, you are at risk of developing an infection. As your white blood cells and neutrophils increased in number, the antibiotics were discontinued as you no longer needed them. It is important that you follow up with your primary care doctor within 1 week to have repeat blood work to confirm continued improvement in your blood cell counts.      SECONDARY DISCHARGE DIAGNOSES  Diagnosis: Transaminitis  Assessment and Plan of Treatment: You had mild elevation in your liver enzymes. This is likely in reponse to the antibiotic (cefepime) you received during admission. Your liver enzymes stabilized and you did not have any concerning abdominal pain. You should follow up with you primary care doctor in 1 week to repeat blood work to ensure your labs are returning to your baseline.     PRINCIPAL DISCHARGE DIAGNOSIS  Diagnosis: Bandemia  Assessment and Plan of Treatment: You came to the hospital with low white blood cell counts. Specifically, 2 types of white blood cells called the neutrophils and platelets were low. Neutrophils help protect your body against infection. Platelets help prevent bleeding. You came to the hospital w/ some GI symptoms including nausea/vomiting. We believe you had a viral gastroenteritis. The viral illness likely caused a temporary suppression of your bone marrow, the part of the body that produces blood cells. As your body recovered and cleared the infection, your cell counts slowly improved. It is unclear which viral illness you had. You were tested for several including EBV (Mono), HIV, Hepatitis, which were all negative. You were treated prophylactically with IV antibiotics. You did not have any confirmed bacterial infection, but with low levels of neutrophils, you are at risk of developing an infection. We reached out to the hematology team during your admission - they will follow-up with you *** As your white blood cells and neutrophils increased in number, the antibiotics were discontinued as you no longer needed them. It is important that you follow up with your primary care doctor within 1 week to have repeat blood work to confirm continued improvement in your blood cell counts.      SECONDARY DISCHARGE DIAGNOSES  Diagnosis: Transaminitis  Assessment and Plan of Treatment: You had mild elevation in your liver enzymes. This is likely in reponse to the antibiotic (cefepime) you received during admission. Your liver enzymes stabilized and you did not have any concerning abdominal pain. You should follow up with you primary care doctor in 1 week to repeat blood work to ensure your labs are returning to your baseline.     PRINCIPAL DISCHARGE DIAGNOSIS  Diagnosis: Bandemia  Assessment and Plan of Treatment: You came to the hospital with low white blood cell counts. Specifically, 2 types of white blood cells called the neutrophils and platelets were low. Neutrophils help protect your body against infection. Platelets help prevent bleeding. You came to the hospital w/ some GI symptoms including nausea/vomiting. We believe you had a viral gastroenteritis. The viral illness likely caused a temporary suppression of your bone marrow, the part of the body that produces blood cells. As your body recovered and cleared the infection, your cell counts slowly improved. It is unclear which viral illness you had. You were tested for several including EBV (Mono), HIV, Hepatitis, which were all negative. You were treated prophylactically with IV antibiotics. You did not have any confirmed bacterial infection, but with low levels of neutrophils, you are at risk of developing an infection. We reached out to the hematology team during your admission - you will need to follow up with your hematolagist outpatient. As your white blood cells and neutrophils increased in number, the antibiotics were discontinued as you no longer needed them. It is important that you follow up with your primary care doctor within 1 week to have repeat blood work to confirm continued improvement in your blood cell counts.      SECONDARY DISCHARGE DIAGNOSES  Diagnosis: Transaminitis  Assessment and Plan of Treatment: You had mild elevation in your liver enzymes. This is likely in reponse to the antibiotic (cefepime) you received during admission. Your liver enzymes stabilized and you did not have any concerning abdominal pain. You should follow up with you primary care doctor in 1 week to repeat blood work to ensure your labs are returning to your baseline.     PRINCIPAL DISCHARGE DIAGNOSIS  Diagnosis: Bandemia  Assessment and Plan of Treatment: You came to the hospital with low white blood cell counts. Specifically, 2 types of white blood cells called the neutrophils and platelets were low. Neutrophils help protect your body against infection. Platelets help prevent bleeding. You came to the hospital w/ some GI symptoms including nausea/vomiting. We believe you had a viral gastroenteritis. The viral illness likely caused a temporary suppression of your bone marrow, the part of the body that produces blood cells. As your body recovered and cleared the infection, your cell counts slowly improved. It is unclear which viral illness you had. You were tested for several including EBV (Mono), HIV, Hepatitis, which were all negative. You were treated prophylactically with IV antibiotics. You did not have any confirmed bacterial infection, but with low levels of neutrophils, you are at risk of developing an infection. We reached out to the hematology team during your admission - you will need to follow up with your hematolagist outpatient. As your white blood cells and neutrophils increased in number, the antibiotics were discontinued as you no longer needed them. It is important that you follow up with your primary care doctor within 1 week to have repeat blood work to confirm continued improvement in your blood cell counts. It is important that you follow up with your hematologist within 1-2 weeks.   If you start developing significant fevers/chills, nausea/vomiting, please seek urgent medical care      SECONDARY DISCHARGE DIAGNOSES  Diagnosis: Transaminitis  Assessment and Plan of Treatment: You had mild elevation in your liver enzymes. This is likely in reponse to the antibiotic (cefepime) you received during admission but can also be due to viral illness. Your liver enzymes stabilized and slightly downtrended and you did not have any concerning abdominal pain. An abdominal ultrasound was normal. You should follow up with you primary care doctor in 1 week to repeat blood work to ensure your labs are returning to your baseline.

## 2023-07-01 NOTE — DISCHARGE NOTE PROVIDER - HOSPITAL COURSE
18 yo Female with PMHx unspecified developmental disorder, scoliosis, p/w low neutrophil count and bandemia. and thrombocytopenia. Patient w/ recent illness w/ nausea and vomiting possible viral gastroenteritis. Patient meeting SIRS criteria, so started on cefepime given neutropenia. Infectious workup so far unrevealing CXR negative, RVP negative, Urine culture negative, HIV negative. Also w/ noted transaminitis. Ordered hepatitis panel and EBV for further workup. B12 and folate noted, no deficiency. UCx were negative. BCX Neg to date. Pt also w/ mild transaminitis likely iso viral gastroenteritis. Pt's labs monitored during hospital course and trended up. Patient's CBC and neutrophils trended upwards to ______ on discharge. Patient's mild elevations in AST/ALT remained stable and were _______ on discharge. Patient should have repeat outpatient labs within 1 week of discharge. On discharge patient is stable, afebrile.    18 yo Female with PMHx unspecified developmental disorder, scoliosis, p/w low neutrophil count and bandemia. and thrombocytopenia. Patient w/ recent illness w/ nausea and vomiting possible viral gastroenteritis. Patient meeting SIRS criteria, so started on cefepime given neutropenia. Infectious workup so far unrevealing CXR negative, RVP negative, Urine culture negative, HIV negative. Also w/ noted transaminitis. Ordered hepatitis panel and EBV for further workup. B12 and folate noted, no deficiency. EBV, HIV, Hepatitis, Hpylori were all negative. UCx were negative. BCX Neg to date. Pt also w/ mild transaminitis likely iso viral gastroenteritis. Pt's labs monitored during hospital course and trended up. Patient's CBC and neutrophils trended upwards to ______ on discharge. Patient's mild elevations in AST/ALT remained stable and were _______ on discharge. Patient should have repeat outpatient labs within 1 week of discharge. On discharge patient is stable, afebrile.    16 yo Female with PMHx unspecified developmental disorder, scoliosis, p/w low neutrophil count and bandemia, and thrombocytopenia. Patient w/ recent illness w/ nausea and vomiting possible viral gastroenteritis. Patient meeting SIRS criteria, so started on cefepime given neutropenia. Infectious workup so far unrevealing - CXR negative, RVP negative, urine culture negative, HIV negative. Also w/ noted transaminitis (peak AST/ALT - 351/313) etiology secondary to cefepime use while admitted vs infectious cause. Ordered hepatitis panel and EBV for further workup. B12 and folate noted, no deficiency. EBV, HIV, Hepatitis, H-pylori were all negative. UCx were negative. BCX no growth to date. IANC persistently low (0.81 on 7/3) since admission despite WBC and platelet trending up (WBC 3.69, Plt 146 at discharge). Hematology consulted on 7/3 to investigate cause of neutropenia, with plan to follow-up _______ inpatient/outpatient***. Patient should have repeat outpatient labs within 1 week of discharge. On discharge patient is stable, afebrile.    18 yo Female with PMHx unspecified developmental disorder, scoliosis, p/w low neutrophil count and bandemia, and thrombocytopenia. Patient w/ recent illness w/ nausea and vomiting possible viral gastroenteritis. Patient meeting SIRS criteria, so started on cefepime given neutropenia. Infectious workup so far unrevealing - CXR negative, RVP negative, urine culture negative, HIV negative. Also w/ noted transaminitis (peak AST/ALT - 351/313) etiology secondary to cefepime use while admitted vs infectious cause. Ordered hepatitis panel and EBV for further workup. B12 and folate noted, no deficiency. EBV, HIV, Hepatitis, H-pylori were all negative. UCx were negative. BCX no growth to date. IANC persistently low (0.81 on 7/3) since admission despite WBC and platelet trending up (WBC 3.69, Plt 146 at discharge). Hematology consulted on 7/3 to investigate cause of neutropenia, with plan to follow-up with hematology outpatient in 1-2 weeks. Patient to get repeat EBV test prior to d/c which heme will f/u on. Patient should have repeat outpatient labs within 1 week of discharge. On discharge patient is stable, afebrile.

## 2023-07-01 NOTE — DISCHARGE NOTE PROVIDER - NSDCCPTREATMENT_GEN_ALL_CORE_FT
PRINCIPAL PROCEDURE  Procedure: US abdomen RUQ  Findings and Treatment: INTERPRETATION:  CLINICAL INFORMATION: Elevated LFTs.  COMPARISON: None available.  TECHNIQUE: Sonography of the right upper quadrant.  FINDINGS:  Liver: Within normal limits.  Bile ducts: Normal caliber. Common bile duct measures 2 mm.  Gallbladder: Within normal limits.  Pancreas: Visualized portions are within normal limits.  Right kidney: 9.6 cm. No hydronephrosis.  Ascites: None.  IVC: Visualized portions are within normal limits.  IMPRESSION:  Normal right upper quadrant abdominal ultrasound.

## 2023-07-01 NOTE — PROGRESS NOTE ADULT - PROBLEM SELECTOR PLAN 1
Patient p/w bicytopenia w/ mild-mod neutropenia (ANC=0.98) w/ reported fever at home but afebrile on presentation. PLI=[id number]  - Etiology likely iso viral marrow suppression: HIV neg, f/u Hepatitis and EBV.   - No splenomegaly or LUQ pain on exam  - Pt reportedly w/ fever at home but afebrile on presentation. Neutropenia w/ ANC 0.98 mild/moderate s/p cefepime x1.   - will start emperic cefepime coverage 2g q8h for now  - Infectious w/u: f/u BCx; UCx  - ; not at risk of spontaneous bleed. c/t monitor  - Trend ANC, PLT, fever curve Patient p/w bicytopenia w/ mild-mod neutropenia (ANC=0.98) w/ reported fever at home but afebrile on presentation. TBC=351  - Etiology likely iso viral marrow suppression: HIV neg, f/u Hepatitis and EBV.   - No splenomegaly or LUQ pain on exam  - Pt febrile yesterday. Neutropenia w/ ANC 0.98 --> 1.4    - c/w emperic cefepime coverage 1g q8h for now  - Infectious w/u: f/u BCx; UCx negative  - PLT improving 122; not at risk of spontaneous bleed. c/t monitor  - Trend ANC, PLT, fever curve

## 2023-07-01 NOTE — DISCHARGE NOTE PROVIDER - NSFOLLOWUPCLINICS_GEN_ALL_ED_FT
[facility] - Primary Care  Primary Care  [address] [place] [address]  Phone: [phone]  Fax:   Follow Up Time: 1 week     [facility] - Primary Care  Primary Care  [address] [place] [address]  Phone: [phone]  Fax:   Follow Up Time: 1 week    [facility]  Hematology/Oncology  [address] [place] [id number]  Phone: [phone]  Fax:   Follow Up Time: 1 week

## 2023-07-01 NOTE — DISCHARGE NOTE PROVIDER - NSDCFUADDAPPT_GEN_ALL_CORE_FT
Please follow up with your primary care doctor within 1-2 weeks to monitor your cell counts and liver enzymes.    If you need a new primary care doctor, please make an appointment with General Internal Medicine, [address] [place] [address], [address], [place] [address]. Please call [phone] to make an appointment   1) Please follow up with your primary care doctor within 1-2 weeks to monitor your cell counts and liver enzymes.    2) Please follow up with hematology at [phone] within 1-2 weeks.     If you need a new primary care doctor, please make an appointment with General Internal Medicine, [address] [place] [address], [address], [place] [address]. Please call [phone] to make an appointment.

## 2023-07-01 NOTE — PROGRESS NOTE ADULT - PROBLEM SELECTOR PLAN 2
Pt meets SIRS criteria on presentation w/ tachycardia and leukopenia. Pt afebrile normotensive  - Etiology of infection likely viral  - Given neutropenia will start cefepime  - F/u Infectious w/u: BCx, UCx

## 2023-07-02 LAB
ALBUMIN SERPL ELPH-MCNC: 3.8 G/DL — SIGNIFICANT CHANGE UP (ref 3.3–5)
ALP SERPL-CCNC: 107 U/L — SIGNIFICANT CHANGE UP (ref 40–120)
ALT FLD-CCNC: 313 U/L — HIGH (ref 4–33)
ANION GAP SERPL CALC-SCNC: 6 MMOL/L — LOW (ref 7–14)
AST SERPL-CCNC: 351 U/L — HIGH (ref 4–32)
BASOPHILS # BLD AUTO: 0.02 K/UL — SIGNIFICANT CHANGE UP (ref 0–0.2)
BASOPHILS NFR BLD AUTO: 0.6 % — SIGNIFICANT CHANGE UP (ref 0–2)
BILIRUB SERPL-MCNC: 0.6 MG/DL — SIGNIFICANT CHANGE UP (ref 0.2–1.2)
BUN SERPL-MCNC: 7 MG/DL — SIGNIFICANT CHANGE UP (ref 7–23)
CALCIUM SERPL-MCNC: 8.8 MG/DL — SIGNIFICANT CHANGE UP (ref 8.4–10.5)
CHLORIDE SERPL-SCNC: 106 MMOL/L — SIGNIFICANT CHANGE UP (ref 98–107)
CO2 SERPL-SCNC: 21 MMOL/L — LOW (ref 22–31)
CREAT SERPL-MCNC: 0.5 MG/DL — SIGNIFICANT CHANGE UP (ref 0.5–1.3)
EGFR: 138 ML/MIN/1.73M2 — SIGNIFICANT CHANGE UP
EOSINOPHIL # BLD AUTO: 0.01 K/UL — SIGNIFICANT CHANGE UP (ref 0–0.5)
EOSINOPHIL NFR BLD AUTO: 0.3 % — SIGNIFICANT CHANGE UP (ref 0–6)
GLUCOSE SERPL-MCNC: 82 MG/DL — SIGNIFICANT CHANGE UP (ref 70–99)
HCT VFR BLD CALC: 34.1 % — LOW (ref 34.5–45)
HGB BLD-MCNC: 11.8 G/DL — SIGNIFICANT CHANGE UP (ref 11.5–15.5)
IANC: 0.85 K/UL — LOW (ref 1.8–7.4)
IMM GRANULOCYTES NFR BLD AUTO: 0.3 % — SIGNIFICANT CHANGE UP (ref 0–0.9)
LYMPHOCYTES # BLD AUTO: 2.14 K/UL — SIGNIFICANT CHANGE UP (ref 1–3.3)
LYMPHOCYTES # BLD AUTO: 66.3 % — HIGH (ref 13–44)
MAGNESIUM SERPL-MCNC: 1.8 MG/DL — SIGNIFICANT CHANGE UP (ref 1.6–2.6)
MCHC RBC-ENTMCNC: 29.7 PG — SIGNIFICANT CHANGE UP (ref 27–34)
MCHC RBC-ENTMCNC: 34.6 GM/DL — SIGNIFICANT CHANGE UP (ref 32–36)
MCV RBC AUTO: 85.9 FL — SIGNIFICANT CHANGE UP (ref 80–100)
MONOCYTES # BLD AUTO: 0.2 K/UL — SIGNIFICANT CHANGE UP (ref 0–0.9)
MONOCYTES NFR BLD AUTO: 6.2 % — SIGNIFICANT CHANGE UP (ref 2–14)
NEUTROPHILS # BLD AUTO: 0.85 K/UL — LOW (ref 1.8–7.4)
NEUTROPHILS NFR BLD AUTO: 26.3 % — LOW (ref 43–77)
NRBC # BLD: 0 /100 WBCS — SIGNIFICANT CHANGE UP (ref 0–0)
NRBC # FLD: 0 K/UL — SIGNIFICANT CHANGE UP (ref 0–0)
PHOSPHATE SERPL-MCNC: 2.8 MG/DL — SIGNIFICANT CHANGE UP (ref 2.5–4.5)
PLATELET # BLD AUTO: 122 K/UL — LOW (ref 150–400)
POTASSIUM SERPL-MCNC: 3.6 MMOL/L — SIGNIFICANT CHANGE UP (ref 3.5–5.3)
POTASSIUM SERPL-SCNC: 3.6 MMOL/L — SIGNIFICANT CHANGE UP (ref 3.5–5.3)
PROT SERPL-MCNC: 6.4 G/DL — SIGNIFICANT CHANGE UP (ref 6–8.3)
RBC # BLD: 3.97 M/UL — SIGNIFICANT CHANGE UP (ref 3.8–5.2)
RBC # FLD: 13.2 % — SIGNIFICANT CHANGE UP (ref 10.3–14.5)
SODIUM SERPL-SCNC: 133 MMOL/L — LOW (ref 135–145)
WBC # BLD: 3.23 K/UL — LOW (ref 3.8–10.5)
WBC # FLD AUTO: 3.23 K/UL — LOW (ref 3.8–10.5)

## 2023-07-02 PROCEDURE — 99233 SBSQ HOSP IP/OBS HIGH 50: CPT | Mod: GC

## 2023-07-02 PROCEDURE — 76705 ECHO EXAM OF ABDOMEN: CPT | Mod: 26

## 2023-07-02 RX ORDER — CEFEPIME 1 G/1
1000 INJECTION, POWDER, FOR SOLUTION INTRAMUSCULAR; INTRAVENOUS EVERY 8 HOURS
Refills: 0 | Status: DISCONTINUED | OUTPATIENT
Start: 2023-07-02 | End: 2023-07-02

## 2023-07-02 RX ADMIN — CEFEPIME 100 MILLIGRAM(S): 1 INJECTION, POWDER, FOR SOLUTION INTRAMUSCULAR; INTRAVENOUS at 06:32

## 2023-07-02 NOTE — PROGRESS NOTE ADULT - PROBLEM SELECTOR PLAN 1
Patient p/w bicytopenia w/ mild-mod neutropenia (ANC=0.98) w/ reported fever at home but afebrile on presentation. LZL=[id number]  - Etiology likely iso viral marrow suppression: HIV neg, f/u Hepatitis and EBV.   - No splenomegaly or LUQ pain on exam  - Pt febrile yesterday. Neutropenia w/ ANC 0.98 --> 1.4    - c/w emperic cefepime coverage 1g q8h for now  - Infectious w/u: f/u BCx; UCx negative  - PLT improving 122; not at risk of spontaneous bleed. c/t monitor  - Trend ANC, PLT, fever curve Patient p/w bicytopenia w/ mild-mod neutropenia (ANC=0.98) w/ reported fever at home but afebrile on presentation. HLX=782  - Etiology likely iso viral marrow suppression: HIV neg, f/u Hepatitis and EBV.   - No splenomegaly or LUQ pain on exam  - Pt febrile yesterday. Neutropenia w/ ANC 0.98 --> 1.4    - c/w emperic cefepime coverage 1g q8h for now  - Infectious w/u: f/u BCx; UCx negative  - PLT improving 122; not at risk of spontaneous bleed. c/t monitor  - Trend ANC, PLT, fever curve  - repeat ANC, will consider starting ertapenem if low ANC/downtrending persists.

## 2023-07-02 NOTE — PROGRESS NOTE ADULT - PROBLEM SELECTOR PLAN 4
Pt w/ mild transaminitis likely iso viral infection  - C/t monitor LFTs Pt w/ mild transaminitis likely iso viral infection  - C/t monitor LFTs  - RUQ US  - Likely DILI iso cefepime

## 2023-07-02 NOTE — PROGRESS NOTE ADULT - SUBJECTIVE AND OBJECTIVE BOX
PROGRESS NOTE:   Authored by Tommy Benson MD   Patient is a 18y old  Female who presents with a chief complaint of Neutropenia (01 Jul 2023 21:55)      SUBJECTIVE / OVERNIGHT EVENTS:  No acute events overnight.     ADDITIONAL REVIEW OF SYSTEMS:    MEDICATIONS  (STANDING):  cefepime   IVPB 1000 milliGRAM(s) IV Intermittent every 8 hours    MEDICATIONS  (PRN):  acetaminophen     Tablet .. 650 milliGRAM(s) Oral every 6 hours PRN Temp greater or equal to 38C (100.4F), Mild Pain (1 - 3)  melatonin 3 milliGRAM(s) Oral at bedtime PRN Insomnia      CAPILLARY BLOOD GLUCOSE        I&O's Summary      PHYSICAL EXAM:  Vital Signs Last 24 Hrs  T(C): 36.9 (02 Jul 2023 05:54), Max: 36.9 (02 Jul 2023 05:54)  T(F): 98.4 (02 Jul 2023 05:54), Max: 98.4 (02 Jul 2023 05:54)  HR: 79 (02 Jul 2023 05:54) (79 - 100)  BP: 102/73 (02 Jul 2023 05:54) (90/64 - 105/79)  BP(mean): --  RR: 18 (02 Jul 2023 05:54) (18 - 18)  SpO2: 98% (02 Jul 2023 05:54) (96% - 99%)    Parameters below as of 02 Jul 2023 05:54  Patient On (Oxygen Delivery Method): room air      GENERAL: NAD, well-groomed, well-developed  HEAD:  Atraumatic, Normocephalic  EYES: EOMI, PERRLA, conjunctiva and sclera clear  ENMT: No tonsillar erythema, exudates, or enlargement; Moist mucous membranes, Good dentition, No lesions  NECK: Supple, No JVD, Normal thyroid  NERVOUS SYSTEM:  Alert & Oriented X3, Good concentration; Motor Strength 5/5 B/L upper and lower extremities; DTRs 2+ intact and symmetric  CHEST/LUNG: Clear to percussion bilaterally; No rales, rhonchi, wheezing, or rubs  HEART: Regular rate and rhythm; No murmurs, rubs, or gallops  ABDOMEN: Soft, Nontender, Nondistended; Bowel sounds present  EXTREMITIES:  2+ Peripheral Pulses, No clubbing, cyanosis, or edema  LYMPH: No lymphadenopathy noted  SKIN: No rashes or lesions    LABS:                        12.0   2.96  )-----------( 122      ( 01 Jul 2023 06:00 )             35.7     07-01    136  |  103  |  8   ----------------------------<  87  3.5   |  20<L>  |  0.61    Ca    9.0      01 Jul 2023 06:00  Phos  2.6     07-01  Mg     1.80     07-01    TPro  6.4  /  Alb  3.8  /  TBili  0.6  /  DBili  x   /  AST  134<H>  /  ALT  114<H>  /  AlkPhos  101  07-01          Urinalysis Basic - ( 01 Jul 2023 06:00 )    Color: x / Appearance: x / SG: x / pH: x  Gluc: 87 mg/dL / Ketone: x  / Bili: x / Urobili: x   Blood: x / Protein: x / Nitrite: x   Leuk Esterase: x / RBC: x / WBC x   Sq Epi: x / Non Sq Epi: x / Bacteria: x        Culture - Blood (collected 30 Jun 2023 02:43)  Source: .Blood Blood-Peripheral  Preliminary Report (01 Jul 2023 12:01):    No growth at 24 hours    Culture - Blood (collected 30 Jun 2023 02:43)  Source: .Blood Blood-Peripheral  Preliminary Report (01 Jul 2023 12:01):    No growth at 24 hours    Culture - Urine (collected 30 Jun 2023 01:41)  Source: Clean Catch Clean Catch (Midstream)  Final Report (01 Jul 2023 08:29):    <10,000 CFU/mL Normal Urogenital Crista        RADIOLOGY & ADDITIONAL TESTS:  Lab Results Reviewed   Imaging Reviewed  Electrocardiogram Reviewed   PROGRESS NOTE:   Authored by Tommy Benson MD   Patient is a 18y old  Female who presents with a chief complaint of Neutropenia (01 Jul 2023 21:55)      SUBJECTIVE / OVERNIGHT EVENTS:  No acute events overnight.     ANC downtrending, AST/ALT uptrending this AM.    No complaints from patient.    ADDITIONAL REVIEW OF SYSTEMS:    MEDICATIONS  (STANDING):  cefepime   IVPB 1000 milliGRAM(s) IV Intermittent every 8 hours    MEDICATIONS  (PRN):  acetaminophen     Tablet .. 650 milliGRAM(s) Oral every 6 hours PRN Temp greater or equal to 38C (100.4F), Mild Pain (1 - 3)  melatonin 3 milliGRAM(s) Oral at bedtime PRN Insomnia      CAPILLARY BLOOD GLUCOSE        I&O's Summary      PHYSICAL EXAM:  Vital Signs Last 24 Hrs  T(C): 36.9 (02 Jul 2023 05:54), Max: 36.9 (02 Jul 2023 05:54)  T(F): 98.4 (02 Jul 2023 05:54), Max: 98.4 (02 Jul 2023 05:54)  HR: 79 (02 Jul 2023 05:54) (79 - 100)  BP: 102/73 (02 Jul 2023 05:54) (90/64 - 105/79)  BP(mean): --  RR: 18 (02 Jul 2023 05:54) (18 - 18)  SpO2: 98% (02 Jul 2023 05:54) (96% - 99%)    Parameters below as of 02 Jul 2023 05:54  Patient On (Oxygen Delivery Method): room air      GENERAL: NAD, well-groomed, well-developed  HEAD:  Atraumatic, Normocephalic  EYES: EOMI, PERRLA, conjunctiva and sclera clear  ENMT: No tonsillar erythema, exudates, or enlargement; Moist mucous membranes, Good dentition, No lesions  NECK: Supple, No JVD, Normal thyroid  NERVOUS SYSTEM:  Alert & Oriented X3, Good concentration; Motor Strength 5/5 B/L upper and lower extremities; DTRs 2+ intact and symmetric  CHEST/LUNG: Clear to percussion bilaterally; No rales, rhonchi, wheezing, or rubs  HEART: Regular rate and rhythm; No murmurs, rubs, or gallops  ABDOMEN: Soft, Nontender, Nondistended; Bowel sounds present  EXTREMITIES:  2+ Peripheral Pulses, No clubbing, cyanosis, or edema  LYMPH: No lymphadenopathy noted  SKIN: No rashes or lesions    LABS:                        12.0   2.96  )-----------( 122      ( 01 Jul 2023 06:00 )             35.7     07-01    136  |  103  |  8   ----------------------------<  87  3.5   |  20<L>  |  0.61    Ca    9.0      01 Jul 2023 06:00  Phos  2.6     07-01  Mg     1.80     07-01    TPro  6.4  /  Alb  3.8  /  TBili  0.6  /  DBili  x   /  AST  134<H>  /  ALT  114<H>  /  AlkPhos  101  07-01          Urinalysis Basic - ( 01 Jul 2023 06:00 )    Color: x / Appearance: x / SG: x / pH: x  Gluc: 87 mg/dL / Ketone: x  / Bili: x / Urobili: x   Blood: x / Protein: x / Nitrite: x   Leuk Esterase: x / RBC: x / WBC x   Sq Epi: x / Non Sq Epi: x / Bacteria: x        Culture - Blood (collected 30 Jun 2023 02:43)  Source: .Blood Blood-Peripheral  Preliminary Report (01 Jul 2023 12:01):    No growth at 24 hours    Culture - Blood (collected 30 Jun 2023 02:43)  Source: .Blood Blood-Peripheral  Preliminary Report (01 Jul 2023 12:01):    No growth at 24 hours    Culture - Urine (collected 30 Jun 2023 01:41)  Source: Clean Catch Clean Catch (Midstream)  Final Report (01 Jul 2023 08:29):    <10,000 CFU/mL Normal Urogenital Crista        RADIOLOGY & ADDITIONAL TESTS:  Lab Results Reviewed   Imaging Reviewed  Electrocardiogram Reviewed

## 2023-07-02 NOTE — PROGRESS NOTE ADULT - ASSESSMENT
17F w/ PMH GERD, developmental disorder, scoliosis p/w bicytopenia (IIR=260; ANC=0.98) likely 2/2 viral marrow suppression.

## 2023-07-02 NOTE — PROVIDER CONTACT NOTE (OTHER) - SITUATION
patient refused cbc labs this afternoon, plan is that she is leaving tomorrow since she is not receiving any IV abx.
Patient temp 100.4F, tylenol PRN given, will reassess
Pt c/o redness to B/L cheeks and anterior thighs

## 2023-07-02 NOTE — PROVIDER CONTACT NOTE (OTHER) - ASSESSMENT
Pt c/o redness to B/L cheeks and anterior thighs
V/S as per flowsheet
patient asymptomatic, just refused the afternoon cbc lab

## 2023-07-02 NOTE — PROVIDER CONTACT NOTE (OTHER) - ACTION/TREATMENT ORDERED:
Provider aware, will await further orders
MD evaluated pt and said to monitor for now.
MD notified and aware.

## 2023-07-02 NOTE — PROGRESS NOTE ADULT - TIME BILLING
Time spent on review of vital signs, labs, prior documentation. Patient interviewed and examined during this encounter. Plan of care was discussed with patient, patient mom and resident team. Encounter documented.
Time spent includes direct patient care  (interview and examination of patient), discussion with other providers, support staff and/or patient's family members, review of medical records, ordering diagnostic tests and analyzing results, and documentation.

## 2023-07-03 VITALS
OXYGEN SATURATION: 100 % | TEMPERATURE: 98 F | RESPIRATION RATE: 18 BRPM | SYSTOLIC BLOOD PRESSURE: 98 MMHG | HEART RATE: 79 BPM | DIASTOLIC BLOOD PRESSURE: 70 MMHG

## 2023-07-03 LAB
ALBUMIN SERPL ELPH-MCNC: 4 G/DL — SIGNIFICANT CHANGE UP (ref 3.3–5)
ALP SERPL-CCNC: 111 U/L — SIGNIFICANT CHANGE UP (ref 40–120)
ALT FLD-CCNC: 290 U/L — HIGH (ref 4–33)
ANION GAP SERPL CALC-SCNC: 14 MMOL/L — SIGNIFICANT CHANGE UP (ref 7–14)
AST SERPL-CCNC: 212 U/L — HIGH (ref 4–32)
BASOPHILS # BLD AUTO: 0.04 K/UL — SIGNIFICANT CHANGE UP (ref 0–0.2)
BASOPHILS # BLD AUTO: 0.04 K/UL — SIGNIFICANT CHANGE UP (ref 0–0.2)
BASOPHILS NFR BLD AUTO: 1.1 % — SIGNIFICANT CHANGE UP (ref 0–2)
BASOPHILS NFR BLD AUTO: 1.1 % — SIGNIFICANT CHANGE UP (ref 0–2)
BILIRUB SERPL-MCNC: 0.4 MG/DL — SIGNIFICANT CHANGE UP (ref 0.2–1.2)
BUN SERPL-MCNC: 6 MG/DL — LOW (ref 7–23)
CALCIUM SERPL-MCNC: 9.1 MG/DL — SIGNIFICANT CHANGE UP (ref 8.4–10.5)
CHLORIDE SERPL-SCNC: 101 MMOL/L — SIGNIFICANT CHANGE UP (ref 98–107)
CO2 SERPL-SCNC: 21 MMOL/L — LOW (ref 22–31)
CREAT SERPL-MCNC: 0.5 MG/DL — SIGNIFICANT CHANGE UP (ref 0.5–1.3)
EGFR: 138 ML/MIN/1.73M2 — SIGNIFICANT CHANGE UP
EOSINOPHIL # BLD AUTO: 0.03 K/UL — SIGNIFICANT CHANGE UP (ref 0–0.5)
EOSINOPHIL # BLD AUTO: 0.03 K/UL — SIGNIFICANT CHANGE UP (ref 0–0.5)
EOSINOPHIL NFR BLD AUTO: 0.8 % — SIGNIFICANT CHANGE UP (ref 0–6)
EOSINOPHIL NFR BLD AUTO: 0.8 % — SIGNIFICANT CHANGE UP (ref 0–6)
GLUCOSE SERPL-MCNC: 86 MG/DL — SIGNIFICANT CHANGE UP (ref 70–99)
HCT VFR BLD CALC: 37.2 % — SIGNIFICANT CHANGE UP (ref 34.5–45)
HGB BLD-MCNC: 12.4 G/DL — SIGNIFICANT CHANGE UP (ref 11.5–15.5)
IANC: 0.81 K/UL — LOW (ref 1.8–7.4)
IMM GRANULOCYTES NFR BLD AUTO: 0.3 % — SIGNIFICANT CHANGE UP (ref 0–0.9)
LYMPHOCYTES # BLD AUTO: 2.6 K/UL — SIGNIFICANT CHANGE UP (ref 1–3.3)
LYMPHOCYTES # BLD AUTO: 2.6 K/UL — SIGNIFICANT CHANGE UP (ref 1–3.3)
LYMPHOCYTES # BLD AUTO: 70.5 % — HIGH (ref 13–44)
LYMPHOCYTES # BLD AUTO: 70.5 % — HIGH (ref 13–44)
MAGNESIUM SERPL-MCNC: 2 MG/DL — SIGNIFICANT CHANGE UP (ref 1.6–2.6)
MCHC RBC-ENTMCNC: 29.5 PG — SIGNIFICANT CHANGE UP (ref 27–34)
MCHC RBC-ENTMCNC: 33.3 GM/DL — SIGNIFICANT CHANGE UP (ref 32–36)
MCV RBC AUTO: 88.4 FL — SIGNIFICANT CHANGE UP (ref 80–100)
MONOCYTES # BLD AUTO: 0.2 K/UL — SIGNIFICANT CHANGE UP (ref 0–0.9)
MONOCYTES # BLD AUTO: 0.2 K/UL — SIGNIFICANT CHANGE UP (ref 0–0.9)
MONOCYTES NFR BLD AUTO: 5.4 % — SIGNIFICANT CHANGE UP (ref 2–14)
MONOCYTES NFR BLD AUTO: 5.4 % — SIGNIFICANT CHANGE UP (ref 2–14)
NEUTROPHILS # BLD AUTO: 0.81 K/UL — LOW (ref 1.8–7.4)
NEUTROPHILS # BLD AUTO: 0.81 K/UL — LOW (ref 1.8–7.4)
NEUTROPHILS NFR BLD AUTO: 21.9 % — LOW (ref 43–77)
NEUTROPHILS NFR BLD AUTO: 21.9 % — LOW (ref 43–77)
NRBC # BLD: 0 /100 WBCS — SIGNIFICANT CHANGE UP (ref 0–0)
NRBC # FLD: 0 K/UL — SIGNIFICANT CHANGE UP (ref 0–0)
PHOSPHATE SERPL-MCNC: 3.4 MG/DL — SIGNIFICANT CHANGE UP (ref 2.5–4.5)
PLAT MORPH BLD: NORMAL — SIGNIFICANT CHANGE UP
PLATELET # BLD AUTO: 146 K/UL — LOW (ref 150–400)
POTASSIUM SERPL-MCNC: 3.8 MMOL/L — SIGNIFICANT CHANGE UP (ref 3.5–5.3)
POTASSIUM SERPL-SCNC: 3.8 MMOL/L — SIGNIFICANT CHANGE UP (ref 3.5–5.3)
PROT SERPL-MCNC: 6.5 G/DL — SIGNIFICANT CHANGE UP (ref 6–8.3)
RBC # BLD: 4.21 M/UL — SIGNIFICANT CHANGE UP (ref 3.8–5.2)
RBC # FLD: 13.5 % — SIGNIFICANT CHANGE UP (ref 10.3–14.5)
RBC BLD AUTO: ABNORMAL
SODIUM SERPL-SCNC: 136 MMOL/L — SIGNIFICANT CHANGE UP (ref 135–145)
WBC # BLD: 3.69 K/UL — LOW (ref 3.8–10.5)
WBC # FLD AUTO: 3.69 K/UL — LOW (ref 3.8–10.5)

## 2023-07-03 PROCEDURE — 99253 IP/OBS CNSLTJ NEW/EST LOW 45: CPT | Mod: GC

## 2023-07-03 PROCEDURE — 99239 HOSP IP/OBS DSCHRG MGMT >30: CPT | Mod: GC

## 2023-07-03 NOTE — PROGRESS NOTE ADULT - ATTENDING COMMENTS
18 yo Female with PMHx unspecified developmental disorder, scoliosis, p/w low neutrophil count and bandemia. and thrombocytopenia. Patient w/ recent illness w/ nausea and vomiting possible viral gastroenteritis. Patient meeting SIRS criteria, so started on cefepime given neutropenia. Infectious workup so far unrevealing CXR negative, RVP negative, Urine culture negative, HIV negative. Also w/ noted transaminitis. Ordered hepatitis panel and EBV for further workup. B12 and folate noted, no deficiency   Blood and urine -NGTD    Could be post viral bone marrow suppression however leukemia within differential given bandemia.   WBC, ANC and Platelets improving , will CTM  plan of care d/w patient and parents at bedside   rest of the management as above
18 yo Female with PMHx, scoliosis, p/w low neutrophil count and bandemia. and thrombocytopenia. Patient w/ recent illness w/ nausea and vomiting possible viral gastroenteritis. Patient meeting SIRS criteria, so started on cefepime given neutropenia. Infectious workup unrevealing CXR negative, RVP negative, Urine culture negative, HIV negative. Also w/ noted transaminitis. hepatitis panel (negative) and EBV (negative) also noted . B12 and folate noted, no deficiency. Blood and urine -NGTD   Likely 2/2 to post viral bone marrow suppression  WBC going up but ANC remains low, will continue  Platelets improving  patient noted to have transaminitis which is now downtrending likely related to cefepime +/- post viral. Normal right upper quadrant abdominal ultrasound.  patient monitored off abx with improvement of LFTs  plan of care d/w patient   Heme consult to assess for alternative etiologies and establish outpatient follow up   rest of the management as above .    33 minutes spent on d'c planning
18 yo Female with PMHx unspecified developmental disorder, scoliosis, p/w low neutrophil count and bandemia. and thrombocytopenia. Patient w/ recent illness w/ nausea and vomiting possible viral gastroenteritis. Patient meeting SIRS criteria, so started on cefepime given neutropenia. Infectious workup so far unrevealing CXR negative, RVP negative, Urine culture negative, HIV negative. Also w/ noted transaminitis. hepatitis panel and EBV also noted . B12 and folate noted, no deficiency   Blood and urine -NGTD    Could be post viral bone marrow suppression however leukemia within differential given bandemia.   WBC going up but ANC slightly decreased, will continue  to trend    Platelets improving , will CTM  pt noted to have a rash on the face, also with worsening transaminitis, drug induced vs post viral , Normal right upper quadrant abdominal ultrasound.  Family requesting to stop antibiotics as they have looked up online and found out Abx can do this, will dc cefepime and monitor off abx   monitor LFTS   plan of care d/w patient and parents at bedside   rest of the management as above

## 2023-07-03 NOTE — PROGRESS NOTE ADULT - ASSESSMENT
17F w/ PMH GERD, developmental disorder, scoliosis p/w bicytopenia (XYX=396; ANC=0.98) likely 2/2 viral marrow suppression.  17F w/ PMH GERD, developmental disorder, scoliosis p/w bicytopenia (WSQ=430; ANC=0.98) likely 2/2 viral marrow suppression. Pt also with mild transaminitis most likely 2/2 to cefepime use during admission.

## 2023-07-03 NOTE — PROGRESS NOTE ADULT - PROBLEM SELECTOR PROBLEM 5
Increased anion gap metabolic acidosis

## 2023-07-03 NOTE — CONSULT NOTE ADULT - ASSESSMENT
17F hx developmental disorder, GERD, and scoliosis, p/w leukopenia and thrombocytopenia, send in by her primary care physician d/t abnormal outpatient labs. Hematology consulted for neutropenia.    #Neutropenia  Patient with acute onset of fever (Tmax 104F), headache, and fatigue. Currently she feels that the likely viral illness has resolved.   - WBC 3.69, ANC 0.81 K/uL as of 7/3/23  - Peripheral smear reviewed 7/3/23:   - Most likely EBV  - Will set up follow up with hematology at the [facility] 17F hx developmental disorder, GERD, and scoliosis, p/w leukopenia and thrombocytopenia, send in by her primary care physician d/t abnormal outpatient labs. Hematology consulted for neutropenia.    #Neutropenia  Patient with acute onset of fever (Tmax 104F), headache, and fatigue. Currently she feels that the likely viral illness has resolved.   - WBC 3.69, ANC 0.81 K/uL as of 7/3/23  - Peripheral smear reviewed 7/3/23: RBCs are normocytic, normochromic, and have slight anisocytosis. Rare schistocytes are seen. Slight polychromasia is noted. No basophilic stippling is seen. No nucleated RBCs are seen. No Bhakta-Jolly bodies or siderotic granules are seen. WBCs appear decreased in number with a decrease in neutrophils. Neutrophils are normal morphologically. Occasional atypical lymphocytes are noted. Monocytes are morphologically normal. No blast-like cells are seen. Platelets appear normal in number. Platelets appear normal morphologically with small forms.  - Most likely EBV infection; can take a few weeks to seroconvert. Please check EBV PCR  - No hematologic contraindications to discharge  - Patient should have CBC w/ diff checked at her PMD's office within a week of discharge. ANC expected to continue to improve with resolution of viral infection.  - Will set up follow up with hematology at the [facility] for the occasional atypical lymphocytes noted on peripheral smear    Note is not finalized until signed by attending.     Kelvin Craft MD  Hematology/Oncology Fellow PGY-5  Pager: [facility] [phone] / [place] [phone]  After 5pm and on weekends please page on-call fellow

## 2023-07-03 NOTE — DISCHARGE NOTE NURSING/CASE MANAGEMENT/SOCIAL WORK - NSDCPEFALRISK_GEN_ALL_CORE
For information on Fall & Injury Prevention, visit: [email or web address] OR  [email or web address] OR  https://www.cdc.gov/steadi/patient.html

## 2023-07-03 NOTE — PROGRESS NOTE ADULT - PROBLEM SELECTOR PLAN 3
Pt w/ elevated TSH w/ normal thyroxine and T3  - Will need to recheck TFT's in 4-6 weeks outpatient w/ PCP

## 2023-07-03 NOTE — PROGRESS NOTE ADULT - SUBJECTIVE AND OBJECTIVE BOX
Talat Ochoa, PGY1    SEBASTIANKEITHDAVID CHETNA  18y  Female    Complaints:  Subjective:     No acute o/n events. Pt denies subj fevers/chills, HA, dizziness, chest pain, palpitations, n/v, abd pain, dysuria, diarrhea      FAMILY HISTORY:    18yVital Signs Last 24 Hrs  T(C): 36.9 (03 Jul 2023 06:28), Max: 36.9 (03 Jul 2023 06:28)  T(F): 98.4 (03 Jul 2023 06:28), Max: 98.4 (03 Jul 2023 06:28)  HR: 79 (03 Jul 2023 06:28) (62 - 88)  BP: 103/70 (03 Jul 2023 06:28) (98/74 - 107/68)  BP(mean): --  RR: 16 (03 Jul 2023 06:28) (16 - 18)  SpO2: 97% (03 Jul 2023 06:28) (97% - 100%)    Parameters below as of 03 Jul 2023 06:28  Patient On (Oxygen Delivery Method): room air          PHYSICAL EXAM:  GENERAL: NAD, well-groomed, well-developed  HEAD:  Atraumatic, Normocephalic  EYES: EOMI, PERRLA, conjunctiva and sclera clear  ENMT: No tonsillar erythema, exudates, or enlargement; Moist mucous membranes, Good dentition, No lesions  NECK: Supple, No JVD, Normal thyroid  NERVOUS SYSTEM:  Alert & Oriented X3, Good concentration; Motor Strength 5/5 B/L upper and lower extremities; DTRs 2+ intact and symmetric  CHEST/LUNG: Clear to percussion bilaterally; No rales, rhonchi, wheezing, or rubs  HEART: Regular rate and rhythm; No murmurs, rubs, or gallops  ABDOMEN: Soft, Nontender, Nondistended; Bowel sounds present  EXTREMITIES:  2+ Peripheral Pulses, No clubbing, cyanosis, or edema  LYMPH: No lymphadenopathy noted  SKIN: No rashes or lesions      Consultant(s) Notes Reviewed:  [x ] YES  [ ] NO  Care Discussed with Consultants/Other Providers [ x] YES  [ ] NO    LABS:                Female  RADIOLOGY & ADDITIONAL TESTS:    Imaging Personally Reviewed:  [ ] YES  [ ] NO    MedsMEDICATIONS  (STANDING):    MEDICATIONS  (PRN):  acetaminophen     Tablet .. 650 milliGRAM(s) Oral every 6 hours PRN Temp greater or equal to 38C (100.4F), Mild Pain (1 - 3)  melatonin 3 milliGRAM(s) Oral at bedtime PRN Insomnia      HEALTH ISSUES - PROBLEM Dx:  Bicytopenia    Prophylactic measure    Subclinical hypothyroidism    Transaminitis    Increased anion gap metabolic acidosis    SIRS (systemic inflammatory response syndrome)                   Talat Ochoa, PGY1    SEBASTIANKEITHDAVID CHETNA  18y  Female    Complaints:  Subjective:     No acute o/n events. Pt denies subj fevers/chills, HA, dizziness, chest pain, palpitations, n/v, abd pain, dysuria, diarrhea      FAMILY HISTORY:    18yVital Signs Last 24 Hrs  T(C): 36.9 (03 Jul 2023 06:28), Max: 36.9 (03 Jul 2023 06:28)  T(F): 98.4 (03 Jul 2023 06:28), Max: 98.4 (03 Jul 2023 06:28)  HR: 79 (03 Jul 2023 06:28) (62 - 88)  BP: 103/70 (03 Jul 2023 06:28) (98/74 - 107/68)  BP(mean): --  RR: 16 (03 Jul 2023 06:28) (16 - 18)  SpO2: 97% (03 Jul 2023 06:28) (97% - 100%)    Parameters below as of 03 Jul 2023 06:28  Patient On (Oxygen Delivery Method): room air          PHYSICAL EXAM:  GENERAL: NAD, well-groomed, well-developed  HEAD:  Atraumatic, Normocephalic  EYES: EOMI, PERRLA, conjunctiva and sclera clear  ENMT: No tonsillar erythema, exudates, or enlargement; Moist mucous membranes, Good dentition, No lesions  NECK: Supple, No JVD, Normal thyroid  NERVOUS SYSTEM:  Alert & Oriented X3, Good concentration; Motor Strength 5/5 B/L upper and lower extremities; DTRs 2+ intact and symmetric  CHEST/LUNG: Clear to percussion bilaterally; No rales, rhonchi, wheezing, or rubs  HEART: Regular rate and rhythm; No murmurs, rubs, or gallops  ABDOMEN: Soft, Nontender, Nondistended; Bowel sounds present  EXTREMITIES:  2+ Peripheral Pulses, No clubbing, cyanosis, or edema  LYMPH: No lymphadenopathy noted  SKIN: No rashes or lesions    Consultant(s) Notes Reviewed:  [x ] YES  [ ] NO  Care Discussed with Consultants/Other Providers [ x] YES  [ ] NO    LABS:                Female  RADIOLOGY & ADDITIONAL TESTS:    Imaging Personally Reviewed:  [ ] YES  [ ] NO    MedsMEDICATIONS  (STANDING):    MEDICATIONS  (PRN):  acetaminophen     Tablet .. 650 milliGRAM(s) Oral every 6 hours PRN Temp greater or equal to 38C (100.4F), Mild Pain (1 - 3)  melatonin 3 milliGRAM(s) Oral at bedtime PRN Insomnia      HEALTH ISSUES - PROBLEM Dx:  Bicytopenia    Prophylactic measure    Subclinical hypothyroidism    Transaminitis    Increased anion gap metabolic acidosis    SIRS (systemic inflammatory response syndrome)               Talat Ochoa, PGY1    CHETNA CARROLL  18y  Female    Complaints: no complaints  Subjective: Pt reports having more energy and improved appetite. No acute o/n events. Pt denies subj fevers/chills, HA, dizziness, chest pain, palpitations, n/v, abd pain, dysuria, diarrhea.    FAMILY HISTORY:    Vital Signs Last 24 Hrs  T(C): 36.9 (03 Jul 2023 06:28), Max: 36.9 (03 Jul 2023 06:28)  T(F): 98.4 (03 Jul 2023 06:28), Max: 98.4 (03 Jul 2023 06:28)  HR: 79 (03 Jul 2023 06:28) (62 - 88)  BP: 103/70 (03 Jul 2023 06:28) (98/74 - 107/68)  BP(mean): --  RR: 16 (03 Jul 2023 06:28) (16 - 18)  SpO2: 97% (03 Jul 2023 06:28) (97% - 100%)    Parameters below as of 03 Jul 2023 06:28  Patient On (Oxygen Delivery Method): room air    PHYSICAL EXAM:  GENERAL: NAD, well-groomed, well-developed  HEAD:  Atraumatic, Normocephalic  EYES: EOMI, PERRLA, conjunctiva and sclera clear  ENMT: No tonsillar erythema, exudates, or enlargement; Moist mucous membranes, Good dentition, No lesions  NECK: Supple, No JVD, Normal thyroid  NERVOUS SYSTEM:  Alert & Oriented X4, Good concentration; Motor Strength 5/5 B/L upper and lower extremities; DTRs 2+ intact and symmetric  CHEST/LUNG: Clear to percussion bilaterally; No rales, rhonchi, wheezing, or rubs  HEART: Regular rate and rhythm; No murmurs, rubs, or gallops  ABDOMEN: Soft, Nontender, Nondistended; Bowel sounds present  EXTREMITIES:  2+ Peripheral Pulses, No clubbing, cyanosis, or edema  LYMPH: No lymphadenopathy noted  SKIN: No rashes. R arm superficial bleeding at IV insertion site    Consultant(s) Notes Reviewed:  [x ] YES  [ ] NO  Care Discussed with Consultants/Other Providers [ x] YES  [ ] NO    LABS:                        12.4   3.69  )-----------( 146      ( 03 Jul 2023 04:58 )             37.2       07-03    136  |  101  |  6<L>  ----------------------------<  86  3.8   |  21<L>  |  0.50    Ca    9.1      03 Jul 2023 04:58  Phos  3.4     07-03  Mg     2.00     07-03    TPro  6.5  /  Alb  4.0  /  TBili  0.4  /  DBili  x   /  AST  212<H>  /  ALT  290<H>  /  AlkPhos  111  07-03       Urinalysis Basic - ( 03 Jul 2023 04:58 )    Color: x / Appearance: x / SG: x / pH: x  Gluc: 86 mg/dL / Ketone: x  / Bili: x / Urobili: x   Blood: x / Protein: x / Nitrite: x   Leuk Esterase: x / RBC: x / WBC x   Sq Epi: x / Non Sq Epi: x / Bacteria: x    Female  RADIOLOGY & ADDITIONAL TESTS: n/a    Imaging Personally Reviewed:  [x] YES  [ ] NO    MEDICATIONS  (STANDING):    MEDICATIONS  (PRN):  acetaminophen     Tablet .. 650 milliGRAM(s) Oral every 6 hours PRN Temp greater or equal to 38C (100.4F), Mild Pain (1 - 3)  melatonin 3 milliGRAM(s) Oral at bedtime PRN Insomnia      HEALTH ISSUES - PROBLEM Dx:  Bicytopenia    Prophylactic measure    Subclinical hypothyroidism    Transaminitis    Increased anion gap metabolic acidosis    SIRS (systemic inflammatory response syndrome)

## 2023-07-03 NOTE — PROGRESS NOTE ADULT - PROBLEM SELECTOR PLAN 4
Pt w/ mild transaminitis likely iso viral infection  - C/t monitor LFTs  - RUQ US  - Likely DILI iso cefepime Pt w/ mild transaminitis likely adverse effect of cefepime vs viral infection. LFTs trending downwards.   - C/t monitor LFTs

## 2023-07-03 NOTE — PROGRESS NOTE ADULT - PROBLEM SELECTOR PLAN 5
AGMA possibly iso ketosis d/t nausea/vomiting  - C/t monitor

## 2023-07-03 NOTE — PROGRESS NOTE ADULT - PROBLEM SELECTOR PLAN 1
Patient p/w bicytopenia w/ mild-mod neutropenia (ANC=0.98) w/ reported fever at home but afebrile on presentation. ENE=896  - Etiology likely iso viral marrow suppression: HIV neg, f/u Hepatitis and EBV.   - No splenomegaly or LUQ pain on exam  - Pt febrile yesterday. Neutropenia w/ ANC 0.98 --> 1.4    - c/w emperic cefepime coverage 1g q8h for now  - Infectious w/u: f/u BCx; UCx negative  - PLT improving 122; not at risk of spontaneous bleed. c/t monitor  - Trend ANC, PLT, fever curve  - repeat ANC, will consider starting ertapenem if low ANC/downtrending persists. Patient p/w bicytopenia (thrombocytopenia and mild-mod neutropenia, ANC=0.98) w/ reported fever at home but afebrile on presentation. Etiology most likely isolated viral marrow suppression, though testing negative - HIV neg, hepatitis panel neg, respiratory virus neg, ebv neg. No splenomegaly or LUQ pain on exam. Pt developed fever on 6/30 to 100.4F, started cefepime, dc'ed on 7/2 AM. Cultures NGTD.   - Follow-up EBV PCR today prior to dc  - Trend ANC, PLT, fever curve

## 2023-07-03 NOTE — CONSULT NOTE ADULT - SUBJECTIVE AND OBJECTIVE BOX
Hematology Consult Note    HPI as per admitting team:   17F w/ PMhx developmental disorder, GERD, and scoliosis, p/w leukopenia and thrombocytopenia, send in by her primary care physician d/t abnormal outpatient labs. On Tuesday, patient started to have a headache, fatigue and fever reportedly up to 104F, which prompted her to go to [facility] ED. Pt's symptoms were a/w NBNB vomiting and lose bowel movements. Patient reports that she was recently on a trip to [place]. She denies any sick contacts. Denies any recent viral prodrome to her symptoms. Per the patients mother, symptoms started rapidly and progressed over several days. Per parents patient has not had any hematologic disorders diagnosed by outpatient PCP. Pt has no family history of blood dyscrasias or malignancies. Patient denies increased bleeding, increased bruising, rashes.    In the ED patient presented afebrile, tachycardic 's/60s, on Room air. Patient was found to have bicytopenia w/ ANC<1; . Started on cefepime and vancomycin. Pt w/ generalized rash and urticaria s/p vancomycin w/o s/s anaphylaxis.    (30 Jun 2023 06:44)        REVIEW OF SYSTEMS:    CONSTITUTIONAL: No weakness, fevers or chills  EYES/ENT: No visual changes;  No vertigo or throat pain   NECK: No pain or stiffness  RESPIRATORY: No cough, wheezing, hemoptysis; No shortness of breath  CARDIOVASCULAR: No chest pain or palpitations  GASTROINTESTINAL: No abdominal or epigastric pain. No nausea, vomiting, or hematemesis; No diarrhea or constipation. No melena or hematochezia.  GENITOURINARY: No dysuria, frequency or hematuria  NEUROLOGICAL: No numbness or weakness  SKIN: No itching, burning, rashes, or lesions   All other review of systems is negative unless indicated above.    PAST MEDICAL & SURGICAL HISTORY:  No pertinent past medical history      No significant past surgical history          FAMILY HISTORY:      SOCIAL HISTORY:     Allergies    vancomycin (Rash)  amoxicillin (Rash)    Intolerances        MEDICATIONS  (STANDING):    MEDICATIONS  (PRN):  acetaminophen     Tablet .. 650 milliGRAM(s) Oral every 6 hours PRN Temp greater or equal to 38C (100.4F), Mild Pain (1 - 3)  melatonin 3 milliGRAM(s) Oral at bedtime PRN Insomnia      OBJECTIVE       T(F): 98.4 (07-03-23 @ 06:28), Max: 98.4 (07-03-23 @ 06:28)  HR: 79 (07-03-23 @ 06:28)  BP: 103/70 (07-03-23 @ 06:28)  RR: 16 (07-03-23 @ 06:28)  SpO2: 97% (07-03-23 @ 06:28)  Wt(kg): --    PHYSICAL EXAM   GENERAL: NAD, well-developed  HEAD:  Atraumatic, Normocephalic  EYES: EOMI, PERRLA, conjunctiva and sclera clear  NECK: Supple, No JVD  CHEST/LUNG: Clear to auscultation bilaterally; No wheeze  HEART: Regular rate and rhythm; No murmurs, rubs, or gallops  ABDOMEN: Soft, Nontender, Nondistended; Bowel sounds present  EXTREMITIES:  2+ Peripheral Pulses, No clubbing, cyanosis, or edema  NEUROLOGY: non-focal  SKIN: No rashes or lesions                          12.4   3.69  )-----------( 146      ( 03 Jul 2023 04:58 )             37.2       07-03    136  |  101  |  6<L>  ----------------------------<  86  3.8   |  21<L>  |  0.50    Ca    9.1      03 Jul 2023 04:58  Phos  3.4     07-03  Mg     2.00     07-03    TPro  6.5  /  Alb  4.0  /  TBili  0.4  /  DBili  x   /  AST  212<H>  /  ALT  290<H>  /  AlkPhos  111  07-03      Magnesium: 2.00 mg/dL (07-03 @ 04:58)  Phosphorus: 3.4 mg/dL (07-03 @ 04:58)       Hematology Consult Note    HPI as per admitting team:   17F w/ PMhx developmental disorder, GERD, and scoliosis, p/w leukopenia and thrombocytopenia, send in by her primary care physician d/t abnormal outpatient labs. On Tuesday, patient started to have a headache, fatigue and fever reportedly up to 104F, which prompted her to go to [facility] ED. Pt's symptoms were a/w NBNB vomiting and lose bowel movements. Patient reports that she was recently on a trip to [place]. She denies any sick contacts. Denies any recent viral prodrome to her symptoms. Per the patients mother, symptoms started rapidly and progressed over several days. Per parents patient has not had any hematologic disorders diagnosed by outpatient PCP. Pt has no family history of blood dyscrasias or malignancies. Patient denies increased bleeding, increased bruising, rashes.    In the ED patient presented afebrile, tachycardic 's/60s, on Room air. Patient was found to have bicytopenia w/ ANC<1; . Started on cefepime and vancomycin. Pt w/ generalized rash and urticaria s/p vancomycin w/o s/s anaphylaxis.    (30 Jun 2023 06:44)        REVIEW OF SYSTEMS:  CONSTITUTIONAL: No weakness, fevers or chills  EYES/ENT: No visual changes;  No vertigo or throat pain   NECK: No pain or stiffness  RESPIRATORY: No cough, wheezing, hemoptysis; No shortness of breath  CARDIOVASCULAR: No chest pain or palpitations  GASTROINTESTINAL: No abdominal or epigastric pain. No nausea, vomiting, or hematemesis; No diarrhea or constipation. No melena or hematochezia.  GENITOURINARY: No dysuria, frequency or hematuria  NEUROLOGICAL: No numbness or weakness  SKIN: No itching, burning, rashes, or lesions   All other review of systems is negative unless indicated above.    PAST MEDICAL & SURGICAL HISTORY:  No pertinent past medical history      No significant past surgical history          FAMILY HISTORY:      SOCIAL HISTORY:     Allergies    vancomycin (Rash)  amoxicillin (Rash)    Intolerances        MEDICATIONS  (STANDING):    MEDICATIONS  (PRN):  acetaminophen     Tablet .. 650 milliGRAM(s) Oral every 6 hours PRN Temp greater or equal to 38C (100.4F), Mild Pain (1 - 3)  melatonin 3 milliGRAM(s) Oral at bedtime PRN Insomnia      OBJECTIVE       T(F): 98.4 (07-03-23 @ 06:28), Max: 98.4 (07-03-23 @ 06:28)  HR: 79 (07-03-23 @ 06:28)  BP: 103/70 (07-03-23 @ 06:28)  RR: 16 (07-03-23 @ 06:28)  SpO2: 97% (07-03-23 @ 06:28)  Wt(kg): --    PHYSICAL EXAM   GENERAL: NAD, well-developed  HEAD:  Atraumatic, Normocephalic  EYES: EOMI, PERRLA, conjunctiva and sclera clear  NECK: Supple, No JVD  CHEST/LUNG: Clear to auscultation bilaterally; No wheeze  HEART: Regular rate and rhythm; No murmurs, rubs, or gallops  ABDOMEN: Soft, Nontender, Nondistended; Bowel sounds present  EXTREMITIES:  2+ Peripheral Pulses, No clubbing, cyanosis, or edema  NEUROLOGY: non-focal  SKIN: No rashes or lesions                          12.4   3.69  )-----------( 146      ( 03 Jul 2023 04:58 )             37.2       07-03    136  |  101  |  6<L>  ----------------------------<  86  3.8   |  21<L>  |  0.50    Ca    9.1      03 Jul 2023 04:58  Phos  3.4     07-03  Mg     2.00     07-03    TPro  6.5  /  Alb  4.0  /  TBili  0.4  /  DBili  x   /  AST  212<H>  /  ALT  290<H>  /  AlkPhos  111  07-03      Magnesium: 2.00 mg/dL (07-03 @ 04:58)  Phosphorus: 3.4 mg/dL (07-03 @ 04:58)       Hematology Consult Note    HPI as per admitting team:   17F w/ PMhx developmental disorder, GERD, and scoliosis, p/w leukopenia and thrombocytopenia, send in by her primary care physician d/t abnormal outpatient labs. On Tuesday, patient started to have a headache, fatigue and fever reportedly up to 104F, which prompted her to go to [facility] ED. Pt's symptoms were a/w NBNB vomiting and lose bowel movements. Patient reports that she was recently on a trip to [place]. She denies any sick contacts. Denies any recent viral prodrome to her symptoms. Per the patients mother, symptoms started rapidly and progressed over several days. Per parents patient has not had any hematologic disorders diagnosed by outpatient PCP. Pt has no family history of blood dyscrasias or malignancies. Patient denies increased bleeding, increased bruising, rashes.    In the ED patient presented afebrile, tachycardic 's/60s, on Room air. Patient was found to have bicytopenia w/ ANC<1; . Started on cefepime and vancomycin. Pt w/ generalized rash and urticaria s/p vancomycin w/o s/s anaphylaxis.    (30 Jun 2023 06:44)        REVIEW OF SYSTEMS:  CONSTITUTIONAL: No weakness, fevers or chills  EYES/ENT: No visual changes;  No vertigo or throat pain   NECK: No pain or stiffness  RESPIRATORY: No cough, wheezing, hemoptysis; No shortness of breath  CARDIOVASCULAR: No chest pain or palpitations  GASTROINTESTINAL: No abdominal or epigastric pain. No nausea, vomiting, or hematemesis; No diarrhea or constipation. No melena or hematochezia.  GENITOURINARY: No dysuria, frequency or hematuria  NEUROLOGICAL: No numbness or weakness  SKIN: No itching, burning, rashes, or lesions   All other review of systems is negative unless indicated above.    PAST MEDICAL & SURGICAL HISTORY:  No pertinent past medical history      No significant past surgical history          FAMILY HISTORY:      SOCIAL HISTORY:     Allergies    vancomycin (Rash)  amoxicillin (Rash)    Intolerances        MEDICATIONS  (STANDING):    MEDICATIONS  (PRN):  acetaminophen     Tablet .. 650 milliGRAM(s) Oral every 6 hours PRN Temp greater or equal to 38C (100.4F), Mild Pain (1 - 3)  melatonin 3 milliGRAM(s) Oral at bedtime PRN Insomnia      OBJECTIVE       T(F): 98.4 (07-03-23 @ 06:28), Max: 98.4 (07-03-23 @ 06:28)  HR: 79 (07-03-23 @ 06:28)  BP: 103/70 (07-03-23 @ 06:28)  RR: 16 (07-03-23 @ 06:28)  SpO2: 97% (07-03-23 @ 06:28)  Wt(kg): --    PHYSICAL EXAM   GENERAL: NAD, well-developed  HEAD:  Atraumatic, Normocephalic  EYES: EOMI, PERRLA, conjunctiva and sclera clear  CHEST/LUNG: Clear to auscultation bilaterally; No wheeze  HEART: Regular rate and rhythm; No murmurs, rubs, or gallops  ABDOMEN: Soft, Nontender, Nondistended; Bowel sounds present. Examination of spleen deferred due to risk of rupture with mononucleosis/splenomegaly  EXTREMITIES:  2+ Peripheral Pulses, No clubbing, cyanosis, or edema  NEUROLOGY: non-focal  SKIN: No rashes or lesions                          12.4   3.69  )-----------( 146      ( 03 Jul 2023 04:58 )             37.2       07-03    136  |  101  |  6<L>  ----------------------------<  86  3.8   |  21<L>  |  0.50    Ca    9.1      03 Jul 2023 04:58  Phos  3.4     07-03  Mg     2.00     07-03    TPro  6.5  /  Alb  4.0  /  TBili  0.4  /  DBili  x   /  AST  212<H>  /  ALT  290<H>  /  AlkPhos  111  07-03      Magnesium: 2.00 mg/dL (07-03 @ 04:58)  Phosphorus: 3.4 mg/dL (07-03 @ 04:58)

## 2023-07-03 NOTE — PROGRESS NOTE ADULT - PROBLEM SELECTOR PLAN 2
Pt meets SIRS criteria on presentation w/ tachycardia and leukopenia. Pt afebrile normotensive  - Etiology of infection likely viral  - Given neutropenia will start cefepime  - F/u Infectious w/u: BCx, UCx Pt meets SIRS criteria on presentation w/ tachycardia and leukopenia. Pt afebrile normotensive  - Etiology of infection likely viral  - F/u Infectious w/u: BCx, UCx

## 2023-07-03 NOTE — DISCHARGE NOTE NURSING/CASE MANAGEMENT/SOCIAL WORK - PATIENT PORTAL LINK FT
You can access the FollowMyHealth Patient Portal offered by [facility] by registering at the following website: [email or web address]. By joining [employer]’s FollowMyHealth portal, you will also be able to view your health information using other applications (apps) compatible with our system.

## 2023-07-03 NOTE — DISCHARGE NOTE NURSING/CASE MANAGEMENT/SOCIAL WORK - NSDCFUADDAPPT_GEN_ALL_CORE_FT
1) Please follow up with your primary care doctor within 1-2 weeks to monitor your cell counts and liver enzymes.    2) Please follow up with hematology at [phone] within 1-2 weeks.     If you need a new primary care doctor, please make an appointment with General Internal Medicine, [address] [place] [address], [address], [place] [address]. Please call [phone] to make an appointment.

## 2023-07-04 LAB
CMV DNA CSF QL NAA+PROBE: SIGNIFICANT CHANGE UP IU/ML
CMV DNA SPEC NAA+PROBE-LOG#: SIGNIFICANT CHANGE UP LOG10IU/ML
EBV EA AB SER IA-ACNC: <5 U/ML — SIGNIFICANT CHANGE UP
EBV EA AB TITR SER IF: NEGATIVE — SIGNIFICANT CHANGE UP
EBV EA IGG SER-ACNC: NEGATIVE — SIGNIFICANT CHANGE UP
EBV NA IGG SER IA-ACNC: <3 U/ML — SIGNIFICANT CHANGE UP
EBV PATRN SPEC IB-IMP: SIGNIFICANT CHANGE UP
EBV VCA IGG AVIDITY SER QL IA: NEGATIVE — SIGNIFICANT CHANGE UP
EBV VCA IGM SER IA-ACNC: <10 U/ML — SIGNIFICANT CHANGE UP
EBV VCA IGM SER IA-ACNC: <10 U/ML — SIGNIFICANT CHANGE UP
EBV VCA IGM TITR FLD: NEGATIVE — SIGNIFICANT CHANGE UP

## 2023-07-05 LAB
B19V IGG SER-ACNC: 7.43 INDEX — HIGH (ref 0–0.9)
B19V IGG+IGM SER-IMP: POSITIVE
B19V IGG+IGM SER-IMP: SIGNIFICANT CHANGE UP
B19V IGM FLD-ACNC: 0.32 INDEX — SIGNIFICANT CHANGE UP (ref 0–0.9)
B19V IGM SER-ACNC: NEGATIVE — SIGNIFICANT CHANGE UP
CULTURE RESULTS: SIGNIFICANT CHANGE UP
CULTURE RESULTS: SIGNIFICANT CHANGE UP
SPECIMEN SOURCE: SIGNIFICANT CHANGE UP
SPECIMEN SOURCE: SIGNIFICANT CHANGE UP